# Patient Record
Sex: FEMALE | Race: WHITE | Employment: FULL TIME | URBAN - METROPOLITAN AREA
[De-identification: names, ages, dates, MRNs, and addresses within clinical notes are randomized per-mention and may not be internally consistent; named-entity substitution may affect disease eponyms.]

---

## 2017-04-14 ENCOUNTER — TRANSFERRED RECORDS (OUTPATIENT)
Dept: HEALTH INFORMATION MANAGEMENT | Facility: CLINIC | Age: 22
End: 2017-04-14

## 2017-06-15 ENCOUNTER — TRANSFERRED RECORDS (OUTPATIENT)
Dept: HEALTH INFORMATION MANAGEMENT | Facility: CLINIC | Age: 22
End: 2017-06-15

## 2018-06-08 ENCOUNTER — TRANSFERRED RECORDS (OUTPATIENT)
Dept: HEALTH INFORMATION MANAGEMENT | Facility: CLINIC | Age: 23
End: 2018-06-08

## 2018-06-08 LAB — PAP SMEAR - HIM PATIENT REPORTED: NEGATIVE

## 2018-06-18 ENCOUNTER — TRANSFERRED RECORDS (OUTPATIENT)
Dept: HEALTH INFORMATION MANAGEMENT | Facility: CLINIC | Age: 23
End: 2018-06-18

## 2018-06-18 LAB
CHOLEST SERPL-MCNC: 162 MG/DL (ref 100–199)
CREAT SERPL-MCNC: 0.57 MG/DL (ref 0.57–1)
GFR SERPL CREATININE-BSD FRML MDRD: 130 ML/MIN/1.73M2
GLUCOSE SERPL-MCNC: 93 MG/DL (ref 65–99)
HDLC SERPL-MCNC: 43 MG/DL
LDLC SERPL CALC-MCNC: 87 MG/DL (ref 0–99)
POTASSIUM SERPL-SCNC: 4.2 MMOL/L (ref 3.5–5.2)
TRIGL SERPL-MCNC: 160 MG/DL (ref 0–149)
TSH SERPL-ACNC: 1.56 UIU/ML (ref 0.45–4.5)

## 2018-06-29 ENCOUNTER — TRANSFERRED RECORDS (OUTPATIENT)
Dept: HEALTH INFORMATION MANAGEMENT | Facility: CLINIC | Age: 23
End: 2018-06-29

## 2019-01-29 ENCOUNTER — OFFICE VISIT (OUTPATIENT)
Dept: FAMILY MEDICINE | Facility: CLINIC | Age: 24
End: 2019-01-29
Payer: COMMERCIAL

## 2019-01-29 VITALS
RESPIRATION RATE: 14 BRPM | WEIGHT: 224.6 LBS | TEMPERATURE: 97.8 F | BODY MASS INDEX: 37.42 KG/M2 | OXYGEN SATURATION: 99 % | HEART RATE: 101 BPM | HEIGHT: 65 IN | SYSTOLIC BLOOD PRESSURE: 118 MMHG | DIASTOLIC BLOOD PRESSURE: 78 MMHG

## 2019-01-29 DIAGNOSIS — F33.1 MODERATE EPISODE OF RECURRENT MAJOR DEPRESSIVE DISORDER (H): ICD-10-CM

## 2019-01-29 DIAGNOSIS — Z23 NEED FOR PROPHYLACTIC VACCINATION AND INOCULATION AGAINST INFLUENZA: ICD-10-CM

## 2019-01-29 DIAGNOSIS — F41.1 GAD (GENERALIZED ANXIETY DISORDER): Primary | ICD-10-CM

## 2019-01-29 PROCEDURE — 90686 IIV4 VACC NO PRSV 0.5 ML IM: CPT | Performed by: INTERNAL MEDICINE

## 2019-01-29 PROCEDURE — 99203 OFFICE O/P NEW LOW 30 MIN: CPT | Mod: 25 | Performed by: INTERNAL MEDICINE

## 2019-01-29 PROCEDURE — 90471 IMMUNIZATION ADMIN: CPT | Performed by: INTERNAL MEDICINE

## 2019-01-29 RX ORDER — SERTRALINE HYDROCHLORIDE 25 MG/1
25 TABLET, FILM COATED ORAL DAILY
Qty: 90 TABLET | Refills: 3 | Status: SHIPPED | OUTPATIENT
Start: 2019-01-29 | End: 2019-03-29

## 2019-01-29 ASSESSMENT — PATIENT HEALTH QUESTIONNAIRE - PHQ9
SUM OF ALL RESPONSES TO PHQ QUESTIONS 1-9: 16
5. POOR APPETITE OR OVEREATING: NEARLY EVERY DAY

## 2019-01-29 ASSESSMENT — ANXIETY QUESTIONNAIRES
IF YOU CHECKED OFF ANY PROBLEMS ON THIS QUESTIONNAIRE, HOW DIFFICULT HAVE THESE PROBLEMS MADE IT FOR YOU TO DO YOUR WORK, TAKE CARE OF THINGS AT HOME, OR GET ALONG WITH OTHER PEOPLE: SOMEWHAT DIFFICULT
1. FEELING NERVOUS, ANXIOUS, OR ON EDGE: MORE THAN HALF THE DAYS
5. BEING SO RESTLESS THAT IT IS HARD TO SIT STILL: SEVERAL DAYS
2. NOT BEING ABLE TO STOP OR CONTROL WORRYING: MORE THAN HALF THE DAYS
6. BECOMING EASILY ANNOYED OR IRRITABLE: NOT AT ALL
GAD7 TOTAL SCORE: 11
7. FEELING AFRAID AS IF SOMETHING AWFUL MIGHT HAPPEN: SEVERAL DAYS
3. WORRYING TOO MUCH ABOUT DIFFERENT THINGS: MORE THAN HALF THE DAYS

## 2019-01-29 ASSESSMENT — MIFFLIN-ST. JEOR: SCORE: 1760.27

## 2019-01-29 NOTE — PROGRESS NOTES
"  SUBJECTIVE:   Tracy Jeffrey is a 23 year old female who presents to clinic today for the following health issues:    Chief Complaint   Patient presents with     Establish Care     Anxiety     x diagnosed 3 years ago     Depression     New Patient/Transfer of Care    Abnormal Mood Symptoms  Onset: diagnosed w/ anxiety about 3 years ago, depression getting worse since Oct. 2018.  Has had issues throughout her life but didn't seek medical help until a few years ago.     Description:   Depression: YES  Anxiety: YES    Accompanying Signs & Symptoms:  Still participating in activities that you used to enjoy: YES/ and no, goes to work and does what she needs to do.  Not doing personal hobbies as much - arts/crafts  Fatigue: YES- definitely  Irritability: no - does not get upset easier, but does get anxious easier.   Difficulty concentrating: YES- very much so - has been the worse of the symptoms lately   Changes in appetite: YES- some days, loses appetite at time.  When sitting around tends to \"mindless\" eat  Problems with sleep: YES- having trouble sleeping recently when historically she's been a good sleeper.   Heart racing/beating fast : no   Thoughts of hurting yourself or others: none    History:   Recent stress: YES- stress intensive job in general, not new.  Getting harder to manage stress w/ job.  A bit of financial stress lately.   Prior depression hospitalization: None  Family history of depression: YES  Family history of anxiety: YES    Precipitating factors:   Alcohol/drug use: no     Alleviating factors:  Staying on top of things helps boost mood.    Being outside when the weather is nicer, likes to take walks and spend time in nature.    Clean house does boost mood, has not been motivated to clean lately though.      Therapies Tried and outcome: Zoloft (Sertraline) - for about 1.5 years, 25 mg once daily- this was helpful, she had a bit of headache and fatigue for the first week on this but that went away. " " She did have weight gain but she thinks that might have been due to other factors.  Stopped about 1.5 years ago since she moved and was having trouble getting her prescription after her move since she wasn't able to get in for her follow-up appointment.  She did gets \"zaps\" after stopping the medication.      She was following with a therapist for awhile until about 2017; this was also helpful.     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        PHQ-9 SCORE 1/29/2019   PHQ-9 Total Score 16     NIKOLAY-7 SCORE 1/29/2019   Total Score 11       Problem list and histories reviewed & adjusted, as indicated.  Additional history: as documented    Patient Active Problem List   Diagnosis     Health Care Home     NIKOLAY (generalized anxiety disorder)     Moderate episode of recurrent major depressive disorder (H)     Past Surgical History:   Procedure Laterality Date     C KYLEENA IUD 19.5 MG  07/2018     HC TOOTH EXTRACTION W/FORCEP  2007/2011    2 occaions     PAP DIAGNOSTIC SMEAR  12/2013    GYN       Social History     Tobacco Use     Smoking status: Never Smoker     Smokeless tobacco: Never Used   Substance Use Topics     Alcohol use: Yes     Alcohol/week: 1.5 oz     Types: 3 Standard drinks or equivalent per week     Family History   Problem Relation Age of Onset     Migraines Mother      Cerebrovascular Disease Maternal Grandfather      Pulmonary Embolism Paternal Grandmother      GERD Sister      Autism Spectrum Disorder Sister 3     Anxiety Disorder Sister      Depression Sister      C.A.D. No family hx of      Diabetes No family hx of      Hypertension No family hx of          Current Outpatient Medications   Medication Sig Dispense Refill     sertraline (ZOLOFT) 25 MG tablet Take 1 tablet (25 mg) by mouth daily 90 tablet 3     Cranberry 450 MG TABS Take 2 tablets by mouth daily       nitrofurantoin, macrocrystal-monohydrate, (MACROBID) 100 MG capsule   0     norgestimate-ethinyl estradiol (ORTHO-CYCLEN, SPRINTEC) " "0.25-35 MG-MCG tablet Take 1 tablet by mouth daily       sulfamethoxazole-trimethoprim (BACTRIM,SEPTRA) 400-80 MG per tablet Take 1 tablet by mouth as needed (Patient not taking: Reported on 1/29/2019) 10 tablet 0     Allergies   Allergen Reactions     Cats      Seasonal Allergies        Reviewed and updated as needed this visit by clinical staff  Tobacco  Allergies  Meds  Med Hx  Surg Hx  Fam Hx  Soc Hx      Reviewed and updated as needed this visit by Provider      Injectable Influenza Immunization Documentation    1.  Is the person to be vaccinated sick today?   No    2. Does the person to be vaccinated have an allergy to a component   of the vaccine?   No  Egg Allergy Algorithm Link    3. Has the person to be vaccinated ever had a serious reaction   to influenza vaccine in the past?   No    4. Has the person to be vaccinated ever had Guillain-Barré syndrome?   No    Form completed by Clare CURRY CMA ([facility])        OBJECTIVE:     /78 (BP Location: Left arm, Patient Position: Sitting, Cuff Size: Adult Large)   Pulse 101   Temp 97.8  F (36.6  C) (Tympanic)   Resp 14   Ht 1.644 m (5' 4.72\")   Wt 101.9 kg (224 lb 9.6 oz)   SpO2 99%   BMI 37.69 kg/m    Body mass index is 37.69 kg/m .  GENERAL: healthy, alert and no distress  PSYCH: mentation appears normal, affect normal/bright      ASSESSMENT/PLAN:       1. NIKOLAY (generalized anxiety disorder)  2. Moderate episode of recurrent major depressive disorder (H)    Tracy presents with worsening symptoms of depression and anxiety recently.  Was previously on sertraline 25mg and that was helpful and without major side effects, so we will resume this.  She is interested in resuming therapy, referred her to our clinic therapist.  Follow-up with me in 2 months for reassessment.     - sertraline (ZOLOFT) 25 MG tablet; Take 1 tablet (25 mg) by mouth daily  Dispense: 90 tablet; Refill: 3    3. Need for prophylactic vaccination and inoculation against influenza    - " FLU VACCINE, SPLIT VIRUS, IM (QUADRIVALENT) [12607]- >3 YRS  - Vaccine Administration, Initial [81446]      Keshav Hernandez MD  [facility]

## 2019-01-29 NOTE — PROGRESS NOTES

## 2019-01-30 ASSESSMENT — ANXIETY QUESTIONNAIRES: GAD7 TOTAL SCORE: 11

## 2019-02-15 ENCOUNTER — HEALTH MAINTENANCE LETTER (OUTPATIENT)
Age: 24
End: 2019-02-15

## 2019-03-23 NOTE — PROGRESS NOTES
SUBJECTIVE:   Tracy Jeffrey is a 23 year old female who presents to clinic today for the following health issues:      I saw Tracy on 1/29 for depression and anxiety.  We resumed 25mg sertraline since this worked for her in the past.  She planned to resume therapy- she has not yet done so, but is planning on doing EAP through work.  Plans to call this week.      Depression and Anxiety Follow-Up    Status since last visit: Improved - feels somewhat better but not all the way.  Motivation is better though not great, and concentration is still impaired.      Other associated symptoms: had drowsiness for a few weeks, that has subsided now.     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  Additional history: as documented    Patient Active Problem List   Diagnosis     Health Care Home     NIKOLAY (generalized anxiety disorder)     Moderate episode of recurrent major depressive disorder (H)     Past Surgical History:   Procedure Laterality Date     C KYLEENA IUD 19.5 MG  07/2018     HC TOOTH EXTRACTION W/FORCEP  2007/2011    2 occaions     PAP DIAGNOSTIC SMEAR  12/2013    GYN       Social History     Tobacco Use     Smoking status: Never Smoker     Smokeless tobacco: Never Used   Substance Use Topics     Alcohol use: Yes     Alcohol/week: 1.5 oz     Types: 3 Standard drinks or equivalent per week     Family History   Problem Relation Age of Onset     Migraines Mother      Cerebrovascular Disease Maternal Grandfather      Pulmonary Embolism Paternal Grandmother       "GERD Sister      Autism Spectrum Disorder Sister 3     Anxiety Disorder Sister      Depression Sister      C.A.D. No family hx of      Diabetes No family hx of      Hypertension No family hx of          Current Outpatient Medications   Medication Sig Dispense Refill     levonorgestrel (KYLEENA) 19.5 MG IUD 1 each by Intrauterine route once Placed June 2018       sertraline (ZOLOFT) 50 MG tablet Take 1 tablet (50 mg) by mouth daily 90 tablet 3     Allergies   Allergen Reactions     Cats      Seasonal Allergies        Reviewed and updated as needed this visit by clinical staff  Tobacco  Allergies  Meds  Med Hx  Surg Hx  Fam Hx  Soc Hx      Reviewed and updated as needed this visit by Provider           OBJECTIVE:     /80   Pulse 80   Ht 1.645 m (5' 4.75\")   Wt 100.2 kg (221 lb)   BMI 37.06 kg/m    Body mass index is 37.06 kg/m .  GENERAL: healthy, alert and no distress  PSYCH: mentation appears normal, affect normal/bright      ASSESSMENT/PLAN:         1. NIKOLAY (generalized anxiety disorder)  2. Moderate episode of recurrent major depressive disorder (H)    Symptoms somewhat improved on the sertraline 25 mg, but she still feels that there could be room for improvement.  She would like to try increasing sertraline up to 50 mg/day.  She plans to establish with therapy at her [facility] through work.  Follow-up in 2 months as needed if not improving..    - sertraline (ZOLOFT) 50 MG tablet; Take 1 tablet (50 mg) by mouth daily  Dispense: 90 tablet; Refill: 3    3. Screening for HIV (human immunodeficiency virus)    - HIV Antigen Antibody Combo      Keshav Hernandez MD  [facility]      "

## 2019-03-29 ENCOUNTER — OFFICE VISIT (OUTPATIENT)
Dept: FAMILY MEDICINE | Facility: CLINIC | Age: 24
End: 2019-03-29
Payer: COMMERCIAL

## 2019-03-29 VITALS
DIASTOLIC BLOOD PRESSURE: 80 MMHG | BODY MASS INDEX: 36.82 KG/M2 | HEIGHT: 65 IN | HEART RATE: 80 BPM | SYSTOLIC BLOOD PRESSURE: 112 MMHG | WEIGHT: 221 LBS

## 2019-03-29 DIAGNOSIS — F33.1 MODERATE EPISODE OF RECURRENT MAJOR DEPRESSIVE DISORDER (H): ICD-10-CM

## 2019-03-29 DIAGNOSIS — Z11.4 SCREENING FOR HIV (HUMAN IMMUNODEFICIENCY VIRUS): ICD-10-CM

## 2019-03-29 DIAGNOSIS — F41.1 GAD (GENERALIZED ANXIETY DISORDER): Primary | ICD-10-CM

## 2019-03-29 PROCEDURE — 36415 COLL VENOUS BLD VENIPUNCTURE: CPT | Performed by: INTERNAL MEDICINE

## 2019-03-29 PROCEDURE — 99213 OFFICE O/P EST LOW 20 MIN: CPT | Performed by: INTERNAL MEDICINE

## 2019-03-29 PROCEDURE — 87389 HIV-1 AG W/HIV-1&-2 AB AG IA: CPT | Performed by: INTERNAL MEDICINE

## 2019-03-29 ASSESSMENT — ANXIETY QUESTIONNAIRES
1. FEELING NERVOUS, ANXIOUS, OR ON EDGE: SEVERAL DAYS
6. BECOMING EASILY ANNOYED OR IRRITABLE: NOT AT ALL
7. FEELING AFRAID AS IF SOMETHING AWFUL MIGHT HAPPEN: NOT AT ALL
2. NOT BEING ABLE TO STOP OR CONTROL WORRYING: SEVERAL DAYS
GAD7 TOTAL SCORE: 5
3. WORRYING TOO MUCH ABOUT DIFFERENT THINGS: SEVERAL DAYS
5. BEING SO RESTLESS THAT IT IS HARD TO SIT STILL: SEVERAL DAYS

## 2019-03-29 ASSESSMENT — PATIENT HEALTH QUESTIONNAIRE - PHQ9
5. POOR APPETITE OR OVEREATING: SEVERAL DAYS
SUM OF ALL RESPONSES TO PHQ QUESTIONS 1-9: 8

## 2019-03-29 ASSESSMENT — MIFFLIN-ST. JEOR: SCORE: 1744.36

## 2019-03-29 NOTE — LETTER
My Depression Action Plan  Name: Tracy Jeffrey   Date of Birth 1995  Date: 3/29/2019    My doctor: Keshav Hernandez   [place]: [facility] [place]  [address] [id number] [phone]          GREEN    ZONE   Good Control    What it looks like:     Things are going generally well. You have normal up s and down s. You may even feel depressed from time to time, but bad moods usually last less than a day.   What you need to do:  1. Continue to care for yourself (see self care plan)  2. Check your depression survival kit and update it as needed  3. Follow your physician s recommendations including any medication.  4. Do not stop taking medication unless you consult with your physician first.           YELLOW         ZONE Getting Worse    What it looks like:     Depression is starting to interfere with your life.     It may be hard to get out of bed; you may be starting to isolate yourself from others.    Symptoms of depression are starting to last most all day and this has happened for several days.     You may have suicidal thoughts but they are not constant.   What you need to do:     1. Call your care team, your response to treatment will improve if you keep your care team informed of your progress. Yellow periods are signs an adjustment may need to be made.     2. Continue your self-care, even if you have to fake it!    3. Talk to someone in your support network    4. Open up your depression survival kit           RED    ZONE Medical Alert - Get Help    What it looks like:     Depression is seriously interfering with your life.     You may experience these or other symptoms: You can t get out of bed most days, can t work or engage in other necessary activities, you have trouble taking care of basic hygiene, or basic responsibilities, thoughts of suicide or death that will not go away, self-injurious behavior.     What you need to do:  1. Call your  care team and request a same-day appointment. If they are not available (weekends or after hours) call your local crisis line, emergency room or 911.            Depression Self Care Plan / Survival Kit    Self-Care for Depression  Here s the deal. Your body and mind are really not as separate as most people think.  What you do and think affects how you feel and how you feel influences what you do and think. This means if you do things that people who feel good do, it will help you feel better.  Sometimes this is all it takes.  There is also a place for medication and therapy depending on how severe your depression is, so be sure to consult with your medical provider and/ or Behavioral Health Consultant if your symptoms are worsening or not improving.     In order to better manage my stress, I will:    Exercise  Get some form of exercise, every day. This will help reduce pain and release endorphins, the  feel good  chemicals in your brain. This is almost as good as taking antidepressants!  This is not the same as joining a gym and then never going! (they count on that by the way ) It can be as simple as just going for a walk or doing some gardening, anything that will get you moving.      Hygiene   Maintain good hygiene (Get out of bed in the morning, Make your bed, Brush your teeth, Take a shower, and Get dressed like you were going to work, even if you are unemployed).  If your clothes don't fit try to get ones that do.    Diet  I will strive to eat foods that are good for me, drink plenty of water, and avoid excessive sugar, caffeine, alcohol, and other mood-altering substances.  Some foods that are helpful in depression are: complex carbohydrates, B vitamins, flaxseed, fish or fish oil, fresh fruits and vegetables.    Psychotherapy  I agree to participate in Individual Therapy (if recommended).    Medication  If prescribed medications, I agree to take them.  Missing doses can result in serious side effects.  I  understand that drinking alcohol, or other illicit drug use, may cause potential side effects.  I will not stop my medication abruptly without first discussing it with my provider.    Staying Connected With Others  I will stay in touch with my friends, family members, and my primary care provider/team.    Use your imagination  Be creative.  We all have a creative side; it doesn t matter if it s oil painting, sand castles, or mud pies! This will also kick up the endorphins.    Witness Beauty  (AKA stop and smell the roses) Take a look outside, even in mid-winter. Notice colors, textures. Watch the squirrels and birds.     Service to others  Be of service to others.  There is always someone else in need.  By helping others we can  get out of ourselves  and remember the really important things.  This also provides opportunities for practicing all the other parts of the program.    Humor  Laugh and be silly!  Adjust your TV habits for less news and crime-drama and more comedy.    Control your stress  Try breathing deep, massage therapy, biofeedback, and meditation. Find time to relax each day.     My support system    Clinic Contact:  Phone number:    Contact 1:  Phone number:    Contact 2:  Phone number:    Jewish/:  Phone number:    Therapist:  Phone number:    Local crisis center:    Phone number:    Other community support:  Phone number:

## 2019-03-30 LAB — HIV 1+2 AB+HIV1 P24 AG SERPL QL IA: NONREACTIVE

## 2019-03-30 ASSESSMENT — ANXIETY QUESTIONNAIRES: GAD7 TOTAL SCORE: 5

## 2019-07-08 ENCOUNTER — TELEPHONE (OUTPATIENT)
Dept: FAMILY MEDICINE | Facility: CLINIC | Age: 24
End: 2019-07-08

## 2019-07-08 ENCOUNTER — MYC MEDICAL ADVICE (OUTPATIENT)
Dept: FAMILY MEDICINE | Facility: CLINIC | Age: 24
End: 2019-07-08

## 2019-07-08 NOTE — TELEPHONE ENCOUNTER
Panel Management Review      Patient has the following on her problem list:     Depression / Dysthymia review    Measure:  Needs PHQ-9 score of 4 or less during index window.  Administer PHQ-9 and if score is 5 or more, send encounter to provider for next steps.    5 - 7 month window range: 5/30/19-9/27/19    PHQ-9 SCORE 1/29/2019 3/29/2019   PHQ-9 Total Score 16 8       If PHQ-9 recheck is 5 or more, route to provider for next steps.    Patient is due for:  PHQ9      Composite cancer screening  Chart review shows that this patient is due/due soon for the following None  Summary:    Patient is due/failing the following:   PHQ9    Action needed:   Patient needs to do PHQ9.    Type of outreach:    Sent [employer] message.   Will postpone x 1 week for the patient to view.    Questions for provider review:    None                                                                                                                                    RENATO Washington MA       Chart routed to Care Team .

## 2019-07-09 ASSESSMENT — ANXIETY QUESTIONNAIRES
7. FEELING AFRAID AS IF SOMETHING AWFUL MIGHT HAPPEN: NOT AT ALL
5. BEING SO RESTLESS THAT IT IS HARD TO SIT STILL: SEVERAL DAYS
GAD7 TOTAL SCORE: 3
GAD7 TOTAL SCORE: 3
4. TROUBLE RELAXING: NOT AT ALL
1. FEELING NERVOUS, ANXIOUS, OR ON EDGE: SEVERAL DAYS
GAD7 TOTAL SCORE: 3
2. NOT BEING ABLE TO STOP OR CONTROL WORRYING: NOT AT ALL
3. WORRYING TOO MUCH ABOUT DIFFERENT THINGS: SEVERAL DAYS
6. BECOMING EASILY ANNOYED OR IRRITABLE: NOT AT ALL
7. FEELING AFRAID AS IF SOMETHING AWFUL MIGHT HAPPEN: NOT AT ALL

## 2019-07-09 ASSESSMENT — PATIENT HEALTH QUESTIONNAIRE - PHQ9
SUM OF ALL RESPONSES TO PHQ QUESTIONS 1-9: 5
SUM OF ALL RESPONSES TO PHQ QUESTIONS 1-9: 5
10. IF YOU CHECKED OFF ANY PROBLEMS, HOW DIFFICULT HAVE THESE PROBLEMS MADE IT FOR YOU TO DO YOUR WORK, TAKE CARE OF THINGS AT HOME, OR GET ALONG WITH OTHER PEOPLE: SOMEWHAT DIFFICULT

## 2019-07-09 NOTE — TELEPHONE ENCOUNTER
Questionnaires completed on [employer].  Routed to provider for review.    PHQ-9 ([employer]) 7/9/2019   1.  Little interest or pleasure in doing things 1   2.  Feeling down, depressed, or hopeless 0   3.  Trouble falling or staying asleep, or sleeping too much 0   4.  Feeling tired or having little energy 1   5.  Poor appetite or overeating 1   6.  Feeling bad about yourself 1   7.  Trouble concentrating 1   8.  Moving slowly or restless 0   9.  Suicidal or self-harm thoughts 0   PHQ-9 Total Score 5   Difficulty at work, home, or with people    1.  Little interest or pleasure in doing things Several days   2.  Feeling down, depressed, or hopeless Not at all   3.  Trouble falling or staying asleep, or sleeping too much Not at all   4.  Feeling tired or having little energy Several days   5.  Poor appetite or overeating Several days   6.  Feeling bad about yourself Several days   7.  Trouble concentrating Several days   8.  Moving slowly or restless Not at all   9.  Suicidal or self-harm thoughts Not at all   PHQ-9 via [employer] TOTAL SCORE-----> 5 (Mild depression)   Difficulty at work, home, or with people Somewhat difficult   NIKOLAY-7   [employer], 2002; Used with Permission) 7/9/2019   1. Feeling nervous, anxious, or on edge Several days   2. Not being able to stop or control worrying Not at all   3. Worrying too much about different things Several days   4. Trouble relaxing Not at all   5. Being so restless that it is hard to sit still Several days   6. Becoming easily annoyed or irritable Not at all   7. Feeling afraid, as if something awful might happen Not at all   NIKOLAY 7 TOTAL SCORE 3 (minimal anxiety)   1. Feeling nervous, anxious, or on edge 1   2. Not being able to stop or control worrying 0   3. Worrying too much about different things 1   4. Trouble relaxing 0   5. Being so restless that it is hard to sit still 1   6. Becoming easily annoyed or irritable 0   7. Feeling afraid, as if something awful might happen 0    NIKOLAY-7 Total Score 3   If you checked any problems, how difficult have they made it for you to do your work, take care of things at home, or get along with other people?

## 2019-07-09 NOTE — TELEPHONE ENCOUNTER
Scores are slightly improved from a few months ago.  Can continue med as prescribed unless she is having any trouble; then would recommend follow-up.    Keshav Hernandez MD

## 2019-07-10 ASSESSMENT — ANXIETY QUESTIONNAIRES: GAD7 TOTAL SCORE: 3

## 2019-07-10 ASSESSMENT — PATIENT HEALTH QUESTIONNAIRE - PHQ9: SUM OF ALL RESPONSES TO PHQ QUESTIONS 1-9: 5

## 2019-08-05 ENCOUNTER — OFFICE VISIT (OUTPATIENT)
Dept: PSYCHOLOGY | Facility: CLINIC | Age: 24
End: 2019-08-05
Payer: COMMERCIAL

## 2019-08-05 DIAGNOSIS — F34.1 PERSISTENT DEPRESSIVE DISORDER: Primary | ICD-10-CM

## 2019-08-05 DIAGNOSIS — F41.1 GAD (GENERALIZED ANXIETY DISORDER): ICD-10-CM

## 2019-08-05 PROCEDURE — 90791 PSYCH DIAGNOSTIC EVALUATION: CPT | Performed by: MARRIAGE & FAMILY THERAPIST

## 2019-08-05 ASSESSMENT — ANXIETY QUESTIONNAIRES
7. FEELING AFRAID AS IF SOMETHING AWFUL MIGHT HAPPEN: NOT AT ALL
5. BEING SO RESTLESS THAT IT IS HARD TO SIT STILL: NOT AT ALL
7. FEELING AFRAID AS IF SOMETHING AWFUL MIGHT HAPPEN: NOT AT ALL
4. TROUBLE RELAXING: SEVERAL DAYS
6. BECOMING EASILY ANNOYED OR IRRITABLE: NOT AT ALL
1. FEELING NERVOUS, ANXIOUS, OR ON EDGE: SEVERAL DAYS
3. WORRYING TOO MUCH ABOUT DIFFERENT THINGS: SEVERAL DAYS
GAD7 TOTAL SCORE: 4
2. NOT BEING ABLE TO STOP OR CONTROL WORRYING: SEVERAL DAYS
GAD7 TOTAL SCORE: 4
GAD7 TOTAL SCORE: 4

## 2019-08-05 ASSESSMENT — COLUMBIA-SUICIDE SEVERITY RATING SCALE - C-SSRS
2. HAVE YOU ACTUALLY HAD ANY THOUGHTS OF KILLING YOURSELF LIFETIME?: NO
1. IN THE PAST MONTH, HAVE YOU WISHED YOU WERE DEAD OR WISHED YOU COULD GO TO SLEEP AND NOT WAKE UP?: NO
6. HAVE YOU EVER DONE ANYTHING, STARTED TO DO ANYTHING, OR PREPARED TO DO ANYTHING TO END YOUR LIFE?: NO
TOTAL  NUMBER OF ABORTED OR SELF INTERRUPTED ATTEMPTS PAST 3 MONTHS: NO
ATTEMPT LIFETIME: NO
TOTAL  NUMBER OF INTERRUPTED ATTEMPTS LIFETIME: NO
2. HAVE YOU ACTUALLY HAD ANY THOUGHTS OF KILLING YOURSELF?: NO
6. HAVE YOU EVER DONE ANYTHING, STARTED TO DO ANYTHING, OR PREPARED TO DO ANYTHING TO END YOUR LIFE?: NO
TOTAL  NUMBER OF INTERRUPTED ATTEMPTS PAST 3 MONTHS: NO
TOTAL  NUMBER OF ABORTED OR SELF INTERRUPTED ATTEMPTS PAST LIFETIME: NO
1. IN THE PAST MONTH, HAVE YOU WISHED YOU WERE DEAD OR WISHED YOU COULD GO TO SLEEP AND NOT WAKE UP?: NO
ATTEMPT PAST THREE MONTHS: NO

## 2019-08-05 ASSESSMENT — PATIENT HEALTH QUESTIONNAIRE - PHQ9
SUM OF ALL RESPONSES TO PHQ QUESTIONS 1-9: 9
SUM OF ALL RESPONSES TO PHQ QUESTIONS 1-9: 9
10. IF YOU CHECKED OFF ANY PROBLEMS, HOW DIFFICULT HAVE THESE PROBLEMS MADE IT FOR YOU TO DO YOUR WORK, TAKE CARE OF THINGS AT HOME, OR GET ALONG WITH OTHER PEOPLE: SOMEWHAT DIFFICULT
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
## 2019-08-05 NOTE — Clinical Note
Greetings, Dr. Hernandez.  I met with Tracy for Diagnostic Assessment/therapy intake.  Plan for therapy will be to focus on mood improvement and increasing calm mindset, along with possible (re-)processing of past trauma.  Thank you for the referral!

## 2019-08-06 ASSESSMENT — ANXIETY QUESTIONNAIRES: GAD7 TOTAL SCORE: 4

## 2019-08-06 ASSESSMENT — PATIENT HEALTH QUESTIONNAIRE - PHQ9: SUM OF ALL RESPONSES TO PHQ QUESTIONS 1-9: 9

## 2019-09-18 ENCOUNTER — PSYCHE (OUTPATIENT)
Dept: PSYCHOLOGY | Facility: CLINIC | Age: 24
End: 2019-09-18
Payer: COMMERCIAL

## 2019-09-18 DIAGNOSIS — F34.1 PERSISTENT DEPRESSIVE DISORDER: Primary | ICD-10-CM

## 2019-09-18 DIAGNOSIS — F41.1 GAD (GENERALIZED ANXIETY DISORDER): ICD-10-CM

## 2019-09-18 PROCEDURE — 90834 PSYTX W PT 45 MINUTES: CPT | Performed by: MARRIAGE & FAMILY THERAPIST

## 2019-09-18 ASSESSMENT — PATIENT HEALTH QUESTIONNAIRE - PHQ9
SUM OF ALL RESPONSES TO PHQ QUESTIONS 1-9: 12
SUM OF ALL RESPONSES TO PHQ QUESTIONS 1-9: 12
10. IF YOU CHECKED OFF ANY PROBLEMS, HOW DIFFICULT HAVE THESE PROBLEMS MADE IT FOR YOU TO DO YOUR WORK, TAKE CARE OF THINGS AT HOME, OR GET ALONG WITH OTHER PEOPLE: SOMEWHAT DIFFICULT

## 2019-09-18 ASSESSMENT — ANXIETY QUESTIONNAIRES
4. TROUBLE RELAXING: SEVERAL DAYS
6. BECOMING EASILY ANNOYED OR IRRITABLE: SEVERAL DAYS
1. FEELING NERVOUS, ANXIOUS, OR ON EDGE: MORE THAN HALF THE DAYS
2. NOT BEING ABLE TO STOP OR CONTROL WORRYING: SEVERAL DAYS
GAD7 TOTAL SCORE: 10
7. FEELING AFRAID AS IF SOMETHING AWFUL MIGHT HAPPEN: SEVERAL DAYS
3. WORRYING TOO MUCH ABOUT DIFFERENT THINGS: MORE THAN HALF THE DAYS
GAD7 TOTAL SCORE: 10
5. BEING SO RESTLESS THAT IT IS HARD TO SIT STILL: MORE THAN HALF THE DAYS
GAD7 TOTAL SCORE: 10
7. FEELING AFRAID AS IF SOMETHING AWFUL MIGHT HAPPEN: SEVERAL DAYS

## 2019-09-18 NOTE — PROGRESS NOTES
Progress Note    Patient Name: Tracy Jeffrey  Date: 9/18/19         Service Type: Individual  Video Visit: No     Session Start Time: 1:10  Session End Time: 1:52     Session Length: 38-52    Session #: 1    Attendees: Client attended alone     Treatment Plan Last Reviewed: 9/18/19, next due 12/18/19.  PHQ-9 / NIKOLAY-7 : completed.    DATA  Interactive Complexity: No  Crisis: No       Progress Since Last Session (Related to Symptoms / Goals / Homework):   Symptoms: Client reported that anxiety/depressive increased and are now decreasing.    Homework: Achieved / completed to satisfaction  Completed in session      Episode of Care Goals: Minimal progress - PREPARATION (Decided to change - considering how); Intervened by negotiating a change plan and determining options / strategies for behavior change, identifying triggers, exploring social supports, and working towards setting a date to begin behavior change     Current / Ongoing Stressors and Concerns:   Client reported that her busy schedule often produces anxiety for her.  Client reported struggling with work/life balance and in some relationships with family members.  Client identified wanting to engage in EMDR therapy, along with working on mood improvement, increasing calm mindset, and improving interpersonal functioning as her desired therapy goals.     Treatment Objective(s) Addressed in This Session:   Increase interest, engagement, and pleasure in doing things  Identify negative self-talk and behaviors: challenge core beliefs, myths, and actions  Improve concentration, focus, and mindfulness in daily activities   Client identified her desired therapy goals.     Intervention:   CBT: taught/reviewed with client behavioral triad.  DBT: taught/reviewed with client engaging in at least 30 minutes/day enjoyable activities.  Motivational Interviewing: used circular/Socratic questioning to elicit client's identification of  her desired therapy goals.  Solution Focused: taught/reviewed with client time management/scheduling strategies.        ASSESSMENT: Current Emotional / Mental Status (status of significant symptoms):   Risk status (Self / Other harm or suicidal ideation)   Patient denies current fears or concerns for personal safety.   Patient denies current or recent suicidal ideation or behaviors.   Patientdenies current or recent homicidal ideation or behaviors.   Patient denies current or recent self injurious behavior or ideation.   Patient denies other safety concerns.   Patient Patient reports there has been no change in risk factors since their last session.     PatientPatient reports there has been no change in protective factors since their last session.     Recommended that patient call 911 or go to the local ED should there be a change in any of these risk factors.     Appearance:   Appropriate    Eye Contact:   Good    Psychomotor Behavior: Normal    Attitude:   Cooperative    Orientation:   All   Speech    Rate / Production: Normal     Volume:  Normal    Mood:    Anxious  Depressed  Normal   Affect:    Appropriate  Worrisome    Thought Content:  Clear    Thought Form:  Coherent  Logical    Insight:    Good      Medication Review:   No changes to current psychiatric medication(s)     Medication Compliance:   Yes     Changes in Health Issues:   None reported     Chemical Use Review:   Substance Use: Chemical use reviewed, no active concerns identified      Tobacco Use: No current tobacco use.      Diagnosis:  1. Persistent depressive disorder    2. NIKOLAY (generalized anxiety disorder)        Collateral Reports Completed:   Not Applicable    PLAN: (Patient Tasks / Therapist Tasks / Other)  Client agreed to work on using taught/reviewed time management/scheduling strategies between now and follow-up session next week.        JEAN-PIERRE Mcgarry                                                          ______________________________________________________________________    Treatment Plan    Patient's Name: Tracy Jeffrey  YOB: 1995    Date: 9/18/19    DSM5 Diagnoses: 300.4 (F34.1) Persistent Depressive Disorder, Early onset or 300.02 (F41.1) Generalized Anxiety Disorder  Psychosocial / Contextual Factors: problems with primary support group: family conflict, trauma history  WHODAS: 23    Referral / Collaboration:  Referral to another professional/service is not indicated at this time..    Anticipated number of session or this episode of care: 11-20      MeasurableTreatment Goal(s) related to diagnosis / functional impairment(s)  Goal 1: Client will successfully process through past trauma defined as reporting 0 Subjective Units of Distress related to trauma on 0-10 scale and 7 on Validity of (positive) Cognition scale about self on 1-7 scale   I will know I've met my goal when I am less impacted by my past trauma.       Objective #A (Client Action)    Status: New - Date: 9/18/19      Client will identify past traumatic events/memories which are causing current distress.     Intervention(s)  Therapist will take client's history and facilitate client's identification of targets for EMDR.     Objective #B  Client will complete needed assessment(s) and [facility] EM[facility] resourcing to confirm readiness for EMDR.    Status: New - Date: 9/18/19      Intervention(s)  Therapist will administer Dissociative Experiences Scale, Multidimensional Inventory of Dissociation as needed and complete [facility] EM[facility] resourcing with client.     Objective #C  Client will engage in installing at least 2 EMDR resources.  Status: New - Date: 9/18/19      Intervention(s)  Therapist will complete EMDR resourcing (Container, Remote Control, grounding/progressive muscle relaxation, Light Stream, Inner Advisor) with client.     Objective #D (Client Action)    Status: New - Date: 9/18/19      Client will engage in  reprocessing all past traumatic event/memory targets.     Intervention(s)   Therapist will complete EMDR reprocessing with client.    Goal 2: Client will increase overall baseline calm mindset by 2 points on a 1-10 Likert scale per self-report (10 = optimal calm mindset).    I will know I've met my goal when I feel less anxious.      Objective #A (Client Action)    Status: New - Date: 9/18/19     Client will use cognitive strategies identified in therapy to challenge anxious thoughts.    Intervention(s)  Therapist will teach emotional regulation skills. CBT/REBT ABCD model.    Objective #B  Client will use at least 2 new coping skills for anxiety management in the next 12 weeks.    Status: New - Date: 9/18/19     Intervention(s)  Therapist will teach emotional regulation skills. DBT Core Mindfulness, Distress Tolerance, Emotion Regulation, and Interpersonal Effectiveness skills.    Goal 3: Client will improve overall baseline mood by 2 points on a 1-10 Likert scale per self-report (10 = optimal mood).    I will know I've met my goal when I feel better/less depressed overall.      Objective #A (Client Action)    Status: New - Date:  9/18/19    Client will Identify negative self-talk and behaviors: challenge core beliefs, myths, and actions.    Intervention(s)  Therapist will teach emotional regulation skills. CBT/REBT ABCD model.    Objective #B  Client will Increase interest, engagement, and pleasure in doing things  Decrease frequency and intensity of feeling down, depressed, hopeless  Improve concentration, focus, and mindfulness in daily activities .    Status: New - Date:  9/18/19    Intervention(s)  Therapist will teach emotional regulation skills. DBT Core Mindfulness, Distress Tolerance, Emotion Regulation, and Interpersonal Effetiveness skills.    Goal 4:  Client will improve her interpersonal functioning in relationships by 2 points on a 1-10 Likert scale per self-report (10 = optimal interpersonal  functioning).    I will know I've met my goal when my relationships have improved.      Objective #A (Client Action)    Status: New - Date: 9/18/19     Client will learn & utilize at least 2 assertive communication skills weekly.    Intervention(s)  Therapist will teach assertiveness skills. Nonviolent Communication and DBT Interpersonal Effectiveness skills..    Objective #B  Client will practice setting boundaries 2 times in the next 12 weeks.    Status: New - Date: 9/18/19     Intervention(s)  Therapist will teach assertiveness skills. [place]-setting..    Patient has reviewed and agreed to the above plan.      Elliot Moreno, LMFT  September 18, 2019

## 2019-09-19 ASSESSMENT — PATIENT HEALTH QUESTIONNAIRE - PHQ9: SUM OF ALL RESPONSES TO PHQ QUESTIONS 1-9: 12

## 2019-09-19 ASSESSMENT — ANXIETY QUESTIONNAIRES: GAD7 TOTAL SCORE: 10

## 2019-09-24 ENCOUNTER — OFFICE VISIT (OUTPATIENT)
Dept: FAMILY MEDICINE | Facility: CLINIC | Age: 24
End: 2019-09-24
Payer: COMMERCIAL

## 2019-09-24 VITALS
HEART RATE: 83 BPM | WEIGHT: 207.6 LBS | SYSTOLIC BLOOD PRESSURE: 116 MMHG | DIASTOLIC BLOOD PRESSURE: 70 MMHG | TEMPERATURE: 97.7 F | OXYGEN SATURATION: 98 % | BODY MASS INDEX: 34.81 KG/M2

## 2019-09-24 DIAGNOSIS — F33.1 MODERATE EPISODE OF RECURRENT MAJOR DEPRESSIVE DISORDER (H): ICD-10-CM

## 2019-09-24 DIAGNOSIS — F41.1 GAD (GENERALIZED ANXIETY DISORDER): Primary | ICD-10-CM

## 2019-09-24 PROCEDURE — 99214 OFFICE O/P EST MOD 30 MIN: CPT | Performed by: INTERNAL MEDICINE

## 2019-09-24 RX ORDER — SERTRALINE HYDROCHLORIDE 25 MG/1
25 TABLET, FILM COATED ORAL DAILY
Start: 2019-09-24 | End: 2019-11-19 | Stop reason: ALTCHOICE

## 2019-09-24 RX ORDER — BUPROPION HYDROCHLORIDE 150 MG/1
150 TABLET ORAL EVERY MORNING
Qty: 90 TABLET | Refills: 0 | Status: SHIPPED | OUTPATIENT
Start: 2019-09-24 | End: 2019-12-26

## 2019-09-24 ASSESSMENT — ANXIETY QUESTIONNAIRES
5. BEING SO RESTLESS THAT IT IS HARD TO SIT STILL: SEVERAL DAYS
GAD7 TOTAL SCORE: 7
6. BECOMING EASILY ANNOYED OR IRRITABLE: NOT AT ALL
1. FEELING NERVOUS, ANXIOUS, OR ON EDGE: SEVERAL DAYS
2. NOT BEING ABLE TO STOP OR CONTROL WORRYING: SEVERAL DAYS
7. FEELING AFRAID AS IF SOMETHING AWFUL MIGHT HAPPEN: NOT AT ALL
3. WORRYING TOO MUCH ABOUT DIFFERENT THINGS: MORE THAN HALF THE DAYS

## 2019-09-24 ASSESSMENT — PATIENT HEALTH QUESTIONNAIRE - PHQ9
SUM OF ALL RESPONSES TO PHQ QUESTIONS 1-9: 14
5. POOR APPETITE OR OVEREATING: MORE THAN HALF THE DAYS

## 2019-09-24 NOTE — PATIENT INSTRUCTIONS
Start the bupropion 150mg daily now and decrease the sertraline to 25mg daily.  Stop the sertraline after 2 weeks and continue the bupropion alone.

## 2019-09-24 NOTE — PROGRESS NOTES
Subjective     Tracy Jeffrey is a 23 year old female who presents to clinic today for the following health issues:  Chief Complaint   Patient presents with     Anxiety     Depression       HPI     I saw Tracy last in March and we increased Zoloft from 25mg to 50mg since symptoms were not controlled, though had improved on the 25mg dose.  She saw psychology on 8/5 and 9/18.      Depression and Anxiety Follow-Up - OVER ALL does not think medication is working great, or as well as it should be (does help some).  Having a lot of drowsiness issues.  Taking medication at night but having difficulty waking in the morning.  She has noticed decreased sexual drive.     How are you doing with your depression since your last visit? Improved a bit, still notices it affecting life. Having more good days than was having in the past.       How are you doing with your anxiety since your last visit? Spikes quite a lot lately,  Worsened a lot of stress w/ work and socially     Are you having other symptoms that might be associated with depression or anxiety? Yes:  sleep issues, motivation issues, task management over eating, binge eating.  Motivation is the biggest issues currently.      Have you had a significant life event? OTHER: 3 weddings - a lot of travel.      Do you have any concerns with your use of alcohol or other drugs? No    Social History     Tobacco Use     Smoking status: Never Smoker     Smokeless tobacco: Never Used   Substance Use Topics     Alcohol use: Yes     Alcohol/week: 2.5 standard drinks     Types: 3 Standard drinks or equivalent per week     Drug use: No     PHQ 8/5/2019 9/18/2019 9/24/2019   PHQ-9 Total Score 9 12 14   Q9: Thoughts of better off dead/self-harm past 2 weeks Not at all Not at all Not at all     NIKOLAY-7 SCORE 8/5/2019 9/18/2019 9/24/2019   Total Score 4 (minimal anxiety) 10 (moderate anxiety) -   Total Score 4 10 7       Suicide Assessment Five-step Evaluation and Treatment (SAFE-T)      How  many servings of fruits and vegetables do you eat daily?  2-3 on good days, NONE on bad days and will eat a lot of fast food.  0On average, how many sweetened beverages do you drink each day (soda, juice, sweet tea, etc)?   0  How many days per week do you miss taking your medication? 1 every now and again.    What makes it hard for you to take your medications?  remembering to take and taking at night and falling asleep before taking medication        Patient Active Problem List   Diagnosis     Health Care Home     NIKOLAY (generalized anxiety disorder)     Moderate episode of recurrent major depressive disorder (H)     Past Surgical History:   Procedure Laterality Date     C KYLEENA IUD 19.5 MG  07/2018     HC TOOTH EXTRACTION W/FORCEP  2007/2011    2 occaions     PAP DIAGNOSTIC SMEAR  12/2013    GYN       Social History     Tobacco Use     Smoking status: Never Smoker     Smokeless tobacco: Never Used   Substance Use Topics     Alcohol use: Yes     Alcohol/week: 2.5 standard drinks     Types: 3 Standard drinks or equivalent per week     Family History   Problem Relation Age of Onset     Migraines Mother      Cerebrovascular Disease Maternal Grandfather      Pulmonary Embolism Paternal Grandmother      GERD Sister      Autism Spectrum Disorder Sister 3     Anxiety Disorder Sister      Depression Sister      C.A.D. No family hx of      Diabetes No family hx of      Hypertension No family hx of          Current Outpatient Medications   Medication Sig Dispense Refill     buPROPion (WELLBUTRIN XL) 150 MG 24 hr tablet Take 1 tablet (150 mg) by mouth every morning 90 tablet 0     Multiple Vitamins-Minerals (MULTIVITAMIN ADULTS PO)        sertraline (ZOLOFT) 25 MG tablet Take 1 tablet (25 mg) by mouth daily for 14 days       levonorgestrel (KYLEENA) 19.5 MG IUD 1 each by Intrauterine route once Placed June 2018       Allergies   Allergen Reactions     Cats      Seasonal Allergies        Reviewed and updated as needed this  visit by Provider         Review of Systems   ROS COMP: Constitutional, psych systems are negative, except as otherwise noted.      Objective    /70 (BP Location: Right arm, Patient Position: Sitting, Cuff Size: Adult Large)   Pulse 83   Temp 97.7  F (36.5  C) (Tympanic)   Wt 94.2 kg (207 lb 9.6 oz)   SpO2 98%   BMI 34.81 kg/m    Body mass index is 34.81 kg/m .  Physical Exam   GENERAL: healthy, alert and no distress  PSYCH: mentation appears normal, affect normal/bright          Assessment & Plan     1. NIKOLAY (generalized anxiety disorder)  and  2. Moderate episode of recurrent major depressive disorder (H)    Tracy does not feel that the sertraline 50 mg tablet is working well for her symptoms and she is finding it side effects of sedation, decreased sexual drive.  We discussed potential alterations in medication therapy.  We could try increasing the sertraline, but this may exacerbate side effects she is having.  We discussed switching to another SSRI, trying an SNRI, or trying Wellbutrin.  She decided to try the Wellbutrin since this should be nonsedating and not have any effect on sexual drive.  She is also trying to lose weight, which may be aided by the Wellbutrin.  We will have her start 150 mg of the Wellbutrin and decrease her sertraline down to 25 mg for 2 weeks, and then stop sertraline and continue the bupropion alone.  Follow-up in 2 months- can be E visit or telephone encounter.    - buPROPion (WELLBUTRIN XL) 150 MG 24 hr tablet; Take 1 tablet (150 mg) by mouth every morning  Dispense: 90 tablet; Refill: 0  - sertraline (ZOLOFT) 25 MG tablet; Take 1 tablet (25 mg) by mouth daily for 14 days      Return in about 2 months (around 11/24/2019) for Routine Visit- can be E visit or telephone visit.    Keshav Hernandez MD  [facility]    Chart documentation was done using Dragon dictation software. Although reviewed after completion, some errors may remain.

## 2019-09-25 ENCOUNTER — PSYCHE (OUTPATIENT)
Dept: PSYCHOLOGY | Facility: CLINIC | Age: 24
End: 2019-09-25
Payer: COMMERCIAL

## 2019-09-25 ENCOUNTER — OFFICE VISIT (OUTPATIENT)
Dept: OBGYN | Facility: CLINIC | Age: 24
End: 2019-09-25
Payer: COMMERCIAL

## 2019-09-25 VITALS
BODY MASS INDEX: 34.89 KG/M2 | SYSTOLIC BLOOD PRESSURE: 125 MMHG | HEART RATE: 101 BPM | RESPIRATION RATE: 16 BRPM | DIASTOLIC BLOOD PRESSURE: 79 MMHG | TEMPERATURE: 98.6 F | HEIGHT: 65 IN | WEIGHT: 209.4 LBS

## 2019-09-25 DIAGNOSIS — Z00.00 ROUTINE GENERAL MEDICAL EXAMINATION AT A HEALTH CARE FACILITY: Primary | ICD-10-CM

## 2019-09-25 DIAGNOSIS — F34.1 PERSISTENT DEPRESSIVE DISORDER: Primary | ICD-10-CM

## 2019-09-25 DIAGNOSIS — F41.1 GAD (GENERALIZED ANXIETY DISORDER): ICD-10-CM

## 2019-09-25 PROCEDURE — 90834 PSYTX W PT 45 MINUTES: CPT | Performed by: MARRIAGE & FAMILY THERAPIST

## 2019-09-25 PROCEDURE — 99385 PREV VISIT NEW AGE 18-39: CPT | Performed by: OBSTETRICS & GYNECOLOGY

## 2019-09-25 ASSESSMENT — ANXIETY QUESTIONNAIRES
2. NOT BEING ABLE TO STOP OR CONTROL WORRYING: SEVERAL DAYS
5. BEING SO RESTLESS THAT IT IS HARD TO SIT STILL: SEVERAL DAYS
1. FEELING NERVOUS, ANXIOUS, OR ON EDGE: SEVERAL DAYS
6. BECOMING EASILY ANNOYED OR IRRITABLE: NOT AT ALL
GAD7 TOTAL SCORE: 7
3. WORRYING TOO MUCH ABOUT DIFFERENT THINGS: MORE THAN HALF THE DAYS
7. FEELING AFRAID AS IF SOMETHING AWFUL MIGHT HAPPEN: NOT AT ALL
GAD7 TOTAL SCORE: 7

## 2019-09-25 ASSESSMENT — PATIENT HEALTH QUESTIONNAIRE - PHQ9
10. IF YOU CHECKED OFF ANY PROBLEMS, HOW DIFFICULT HAVE THESE PROBLEMS MADE IT FOR YOU TO DO YOUR WORK, TAKE CARE OF THINGS AT HOME, OR GET ALONG WITH OTHER PEOPLE: SOMEWHAT DIFFICULT
SUM OF ALL RESPONSES TO PHQ QUESTIONS 1-9: 14
SUM OF ALL RESPONSES TO PHQ QUESTIONS 1-9: 15
SUM OF ALL RESPONSES TO PHQ QUESTIONS 1-9: 14
5. POOR APPETITE OR OVEREATING: MORE THAN HALF THE DAYS

## 2019-09-25 ASSESSMENT — MIFFLIN-ST. JEOR: SCORE: 1695.71

## 2019-09-25 NOTE — PROGRESS NOTES
"                                           Progress Note    Patient Name: Tracy Jeffrey  Date: 9/25/19         Service Type: Individual  Video Visit: No     Session Start Time: 1:00  Session End Time: 1:52     Session Length: 38-52    Session #: 2    Attendees: Client attended alone     Treatment Plan Last Reviewed: 9/18/19, next due 12/18/19.  PHQ-9 / NIKOLAY-7 : completed.    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  Client reported having anxiety with having to coordinate her cousin's bachelorette party with her mother, who is invalidating towards her.     Treatment Objective(s) Addressed in This Session:   use at least some coping skills for anxiety management in the next three weeks  Identify negative self-talk and behaviors: challenge core beliefs, myths, and actions  Improve concentration, focus, and mindfulness in daily activities      Intervention:   CBT: taught/reviewed with client empathic distress concept and being aware of separation between herself and others' sufferings..  DBT: taught/reviewed with client mindfulness practice of focusing on current reality and choosing focus, and practicing self-validation.        ASSESSMENT: Current Emotional / Mental Status (status of significant symptoms):   Risk status (Self / Other harm or suicidal ideation)   Patient denies current fears or concerns for personal safety.   Patient denies current or recent suicidal " ideation or behaviors.   Patientdenies current or recent homicidal ideation or behaviors.   Patient denies current or recent self injurious behavior or ideation.   Patient denies other safety concerns.   Patient Patient reports there has been no change in risk factors since their last session.     PatientPatient reports there has been no change in protective factors since their last session.     Recommended that patient call 911 or go to the local ED should there be a change in any of these risk factors.     Appearance:   Appropriate    Eye Contact:   Good    Psychomotor Behavior: Normal    Attitude:   Cooperative    Orientation:   All   Speech    Rate / Production: Normal     Volume:  Normal    Mood:    Anxious  Depressed  Normal   Affect:    Appropriate  Worrisome    Thought Content:  Clear    Thought Form:  Coherent  Logical    Insight:    Good      Medication Review:   No changes to current psychiatric medication(s)     Medication Compliance:   Yes     Changes in Health Issues:   None reported     Chemical Use Review:   Substance Use: Chemical use reviewed, no active concerns identified      Tobacco Use: No current tobacco use.      Diagnosis:  1. Persistent depressive disorder    2. NIKOLAY (generalized anxiety disorder)        Collateral Reports Completed:   Not Applicable    PLAN: (Patient Tasks / Therapist Tasks / Other)  Client agreed to work on using taught/reviewed mindfulness/self-validation/empathic distress skills/strategies between now and follow-up session in three weeks.        JEAN-PIERRE Mcgarry                                                         ______________________________________________________________________    Treatment Plan    Patient's Name: Tracy Jeffrey  YOB: 1995    Date: 9/18/19    DSM5 Diagnoses: 300.4 (F34.1) Persistent Depressive Disorder, Early onset or 300.02 (F41.1) Generalized Anxiety Disorder  Psychosocial / Contextual Factors: problems with primary  support group: family conflict, trauma history  WHODAS: 25    Referral / Collaboration:  Referral to another professional/service is not indicated at this time..    Anticipated number of session or this episode of care: 11-20      MeasurableTreatment Goal(s) related to diagnosis / functional impairment(s)  Goal 1: Client will successfully process through past trauma defined as reporting 0 Subjective Units of Distress related to trauma on 0-10 scale and 7 on Validity of (positive) Cognition scale about self on 1-7 scale   I will know I've met my goal when I am less impacted by my past trauma.       Objective #A (Client Action)    Status: New - Date: 9/18/19      Client will identify past traumatic events/memories which are causing current distress.     Intervention(s)  Therapist will take client's history and facilitate client's identification of targets for EMDR.     Objective #B  Client will complete needed assessment(s) and [facility] EM[facility] resourcing to confirm readiness for EMDR.    Status: New - Date: 9/18/19      Intervention(s)  Therapist will administer Dissociative Experiences Scale, Multidimensional Inventory of Dissociation as needed and complete [facility] EMDR resourcing with client.     Objective #C  Client will engage in installing at least 2 EMDR resources.  Status: New - Date: 9/18/19      Intervention(s)  Therapist will complete EMDR resourcing (Container, Remote Control, grounding/progressive muscle relaxation, Light Stream, Inner Advisor) with client.     Objective #D (Client Action)    Status: New - Date: 9/18/19      Client will engage in reprocessing all past traumatic event/memory targets.     Intervention(s)   Therapist will complete EMDR reprocessing with client.    Goal 2: Client will increase overall baseline calm mindset by 2 points on a 1-10 Likert scale per self-report (10 = optimal calm mindset).    I will know I've met my goal when I feel less anxious.      Objective #A (Client  Action)    Status: New - Date: 9/18/19     Client will use cognitive strategies identified in therapy to challenge anxious thoughts.    Intervention(s)  Therapist will teach emotional regulation skills. CBT/REBT ABCD model.    Objective #B  Client will use at least 2 new coping skills for anxiety management in the next 12 weeks.    Status: New - Date: 9/18/19     Intervention(s)  Therapist will teach emotional regulation skills. DBT Core Mindfulness, Distress Tolerance, Emotion Regulation, and Interpersonal Effectiveness skills.    Goal 3: Client will improve overall baseline mood by 2 points on a 1-10 Likert scale per self-report (10 = optimal mood).    I will know I've met my goal when I feel better/less depressed overall.      Objective #A (Client Action)    Status: New - Date:  9/18/19    Client will Identify negative self-talk and behaviors: challenge core beliefs, myths, and actions.    Intervention(s)  Therapist will teach emotional regulation skills. CBT/REBT ABCD model.    Objective #B  Client will Increase interest, engagement, and pleasure in doing things  Decrease frequency and intensity of feeling down, depressed, hopeless  Improve concentration, focus, and mindfulness in daily activities .    Status: New - Date:  9/18/19    Intervention(s)  Therapist will teach emotional regulation skills. DBT Core Mindfulness, Distress Tolerance, Emotion Regulation, and Interpersonal Effetiveness skills.    Goal 4:  Client will improve her interpersonal functioning in relationships by 2 points on a 1-10 Likert scale per self-report (10 = optimal interpersonal functioning).    I will know I've met my goal when my relationships have improved.      Objective #A (Client Action)    Status: New - Date: 9/18/19     Client will learn & utilize at least 2 assertive communication skills weekly.    Intervention(s)  Therapist will teach assertiveness skills. Nonviolent Communication and DBT Interpersonal Effectiveness  skills..    Objective #B  Client will practice setting boundaries 2 times in the next 12 weeks.    Status: New - Date: 9/18/19     Intervention(s)  Therapist will teach assertiveness skills. [place]-setting..    Patient has reviewed and agreed to the above plan.      Elliot Moreno, LMFT  September 25, 2019

## 2019-09-25 NOTE — NURSING NOTE
"Initial /79 (BP Location: Left arm, Patient Position: Chair, Cuff Size: Adult Regular)   Pulse 101   Temp 98.6  F (37  C) (Tympanic)   Resp 16   Ht 1.651 m (5' 5\")   Wt 95 kg (209 lb 6.4 oz)   LMP 08/30/2019 (Approximate)   BMI 34.85 kg/m   Estimated body mass index is 34.85 kg/m  as calculated from the following:    Height as of this encounter: 1.651 m (5' 5\").    Weight as of this encounter: 95 kg (209 lb 6.4 oz). .      "
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
## 2019-09-26 ASSESSMENT — ANXIETY QUESTIONNAIRES: GAD7 TOTAL SCORE: 7

## 2019-09-26 ASSESSMENT — PATIENT HEALTH QUESTIONNAIRE - PHQ9: SUM OF ALL RESPONSES TO PHQ QUESTIONS 1-9: 14

## 2019-10-16 ENCOUNTER — PSYCHE (OUTPATIENT)
Dept: PSYCHOLOGY | Facility: CLINIC | Age: 24
End: 2019-10-16
Payer: COMMERCIAL

## 2019-10-16 DIAGNOSIS — F34.1 PERSISTENT DEPRESSIVE DISORDER: Primary | ICD-10-CM

## 2019-10-16 DIAGNOSIS — F41.1 GAD (GENERALIZED ANXIETY DISORDER): ICD-10-CM

## 2019-10-16 PROCEDURE — 90834 PSYTX W PT 45 MINUTES: CPT | Performed by: MARRIAGE & FAMILY THERAPIST

## 2019-10-16 ASSESSMENT — ANXIETY QUESTIONNAIRES
1. FEELING NERVOUS, ANXIOUS, OR ON EDGE: SEVERAL DAYS
5. BEING SO RESTLESS THAT IT IS HARD TO SIT STILL: SEVERAL DAYS
GAD7 TOTAL SCORE: 5
4. TROUBLE RELAXING: SEVERAL DAYS
3. WORRYING TOO MUCH ABOUT DIFFERENT THINGS: SEVERAL DAYS
7. FEELING AFRAID AS IF SOMETHING AWFUL MIGHT HAPPEN: NOT AT ALL
GAD7 TOTAL SCORE: 5
7. FEELING AFRAID AS IF SOMETHING AWFUL MIGHT HAPPEN: NOT AT ALL
2. NOT BEING ABLE TO STOP OR CONTROL WORRYING: NOT AT ALL
6. BECOMING EASILY ANNOYED OR IRRITABLE: SEVERAL DAYS
GAD7 TOTAL SCORE: 5

## 2019-10-16 ASSESSMENT — PATIENT HEALTH QUESTIONNAIRE - PHQ9
SUM OF ALL RESPONSES TO PHQ QUESTIONS 1-9: 10
10. IF YOU CHECKED OFF ANY PROBLEMS, HOW DIFFICULT HAVE THESE PROBLEMS MADE IT FOR YOU TO DO YOUR WORK, TAKE CARE OF THINGS AT HOME, OR GET ALONG WITH OTHER PEOPLE: SOMEWHAT DIFFICULT
SUM OF ALL RESPONSES TO PHQ QUESTIONS 1-9: 10
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
## 2019-10-17 ASSESSMENT — PATIENT HEALTH QUESTIONNAIRE - PHQ9: SUM OF ALL RESPONSES TO PHQ QUESTIONS 1-9: 10

## 2019-10-17 ASSESSMENT — ANXIETY QUESTIONNAIRES: GAD7 TOTAL SCORE: 5

## 2019-10-29 ENCOUNTER — OFFICE VISIT (OUTPATIENT)
Dept: PSYCHOLOGY | Facility: CLINIC | Age: 24
End: 2019-10-29
Payer: COMMERCIAL

## 2019-10-29 DIAGNOSIS — F41.1 GAD (GENERALIZED ANXIETY DISORDER): ICD-10-CM

## 2019-10-29 DIAGNOSIS — F34.1 PERSISTENT DEPRESSIVE DISORDER: Primary | ICD-10-CM

## 2019-10-29 PROCEDURE — 90832 PSYTX W PT 30 MINUTES: CPT | Performed by: MARRIAGE & FAMILY THERAPIST

## 2019-10-29 ASSESSMENT — PATIENT HEALTH QUESTIONNAIRE - PHQ9
SUM OF ALL RESPONSES TO PHQ QUESTIONS 1-9: 14
10. IF YOU CHECKED OFF ANY PROBLEMS, HOW DIFFICULT HAVE THESE PROBLEMS MADE IT FOR YOU TO DO YOUR WORK, TAKE CARE OF THINGS AT HOME, OR GET ALONG WITH OTHER PEOPLE: VERY DIFFICULT
5. POOR APPETITE OR OVEREATING: NEARLY EVERY DAY
SUM OF ALL RESPONSES TO PHQ QUESTIONS 1-9: 14

## 2019-10-29 ASSESSMENT — ANXIETY QUESTIONNAIRES
7. FEELING AFRAID AS IF SOMETHING AWFUL MIGHT HAPPEN: SEVERAL DAYS
6. BECOMING EASILY ANNOYED OR IRRITABLE: MORE THAN HALF THE DAYS
5. BEING SO RESTLESS THAT IT IS HARD TO SIT STILL: SEVERAL DAYS
1. FEELING NERVOUS, ANXIOUS, OR ON EDGE: MORE THAN HALF THE DAYS
3. WORRYING TOO MUCH ABOUT DIFFERENT THINGS: MORE THAN HALF THE DAYS
IF YOU CHECKED OFF ANY PROBLEMS ON THIS QUESTIONNAIRE, HOW DIFFICULT HAVE THESE PROBLEMS MADE IT FOR YOU TO DO YOUR WORK, TAKE CARE OF THINGS AT HOME, OR GET ALONG WITH OTHER PEOPLE: VERY DIFFICULT
2. NOT BEING ABLE TO STOP OR CONTROL WORRYING: MORE THAN HALF THE DAYS
GAD7 TOTAL SCORE: 13
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
## 2019-10-30 ASSESSMENT — ANXIETY QUESTIONNAIRES: GAD7 TOTAL SCORE: 13

## 2019-10-30 ASSESSMENT — PATIENT HEALTH QUESTIONNAIRE - PHQ9: SUM OF ALL RESPONSES TO PHQ QUESTIONS 1-9: 14

## 2019-11-15 ENCOUNTER — OFFICE VISIT (OUTPATIENT)
Dept: PSYCHOLOGY | Facility: CLINIC | Age: 24
End: 2019-11-15
Payer: COMMERCIAL

## 2019-11-15 DIAGNOSIS — F41.1 GAD (GENERALIZED ANXIETY DISORDER): ICD-10-CM

## 2019-11-15 DIAGNOSIS — F34.1 PERSISTENT DEPRESSIVE DISORDER: Primary | ICD-10-CM

## 2019-11-15 PROCEDURE — 90832 PSYTX W PT 30 MINUTES: CPT | Performed by: MARRIAGE & FAMILY THERAPIST

## 2019-11-15 ASSESSMENT — ANXIETY QUESTIONNAIRES
6. BECOMING EASILY ANNOYED OR IRRITABLE: SEVERAL DAYS
7. FEELING AFRAID AS IF SOMETHING AWFUL MIGHT HAPPEN: SEVERAL DAYS
1. FEELING NERVOUS, ANXIOUS, OR ON EDGE: MORE THAN HALF THE DAYS
GAD7 TOTAL SCORE: 9
4. TROUBLE RELAXING: SEVERAL DAYS
5. BEING SO RESTLESS THAT IT IS HARD TO SIT STILL: SEVERAL DAYS
3. WORRYING TOO MUCH ABOUT DIFFERENT THINGS: MORE THAN HALF THE DAYS
GAD7 TOTAL SCORE: 9
GAD7 TOTAL SCORE: 9
2. NOT BEING ABLE TO STOP OR CONTROL WORRYING: SEVERAL DAYS
7. FEELING AFRAID AS IF SOMETHING AWFUL MIGHT HAPPEN: SEVERAL DAYS

## 2019-11-15 ASSESSMENT — PATIENT HEALTH QUESTIONNAIRE - PHQ9
SUM OF ALL RESPONSES TO PHQ QUESTIONS 1-9: 11
10. IF YOU CHECKED OFF ANY PROBLEMS, HOW DIFFICULT HAVE THESE PROBLEMS MADE IT FOR YOU TO DO YOUR WORK, TAKE CARE OF THINGS AT HOME, OR GET ALONG WITH OTHER PEOPLE: SOMEWHAT DIFFICULT
SUM OF ALL RESPONSES TO PHQ QUESTIONS 1-9: 11

## 2019-11-15 NOTE — PROGRESS NOTES
Progress Note    Patient Name: Tracy Jeffrey  Date: 11/15/19         Service Type: Individual  Video Visit: No     Session Start Time: 8:30  Session End Time: 9:00     Session Length: 16-37    Session #: 5    Attendees: Client attended alone     Treatment Plan Last Reviewed: 9/18/19, next due 12/18/19.  PHQ-9 / NIKOLAY-7 : completed.    DATA  Interactive Complexity: No  Crisis: No       Progress Since Last Session (Related to Symptoms / Goals / Homework):   Symptoms: Improving client reported decreased PHQ-9 and NIKOLAY-7 scores.    Homework: Partially completed       Episode of Care Goals: Satisfactory progress - ACTION (Actively working towards change); Intervened by reinforcing change plan / affirming steps taken     Current / Ongoing Stressors and Concerns:   Client reported experiencing general lack of motivation regarding going to work.  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  Client reported struggling with a lot of past traumatic material.     Treatment Objective(s) Addressed in This Session:   Increase interest, engagement, and pleasure in doing things  Feel less tired and more energy during the day   Improve concentration, focus, and mindfulness in daily activities      Intervention:   DBT: re-reviewed with client practicing effective self-care to increase her energy/motivation.        ASSESSMENT: Current Emotional / Mental Status (status of significant symptoms):   Risk status (Self / Other harm or suicidal ideation)   Patient denies current fears or concerns for personal safety.   Patient denies current or recent suicidal ideation or behaviors.   Patientdenies current or recent homicidal ideation or behaviors.   Patient denies current or recent self injurious behavior or ideation.   Patient denies other safety concerns.   Patient Patient reports there has been no change in risk factors since their last session.     PatientPatient reports  there has been no change in protective factors since their last session.     Recommended that patient call 911 or go to the local ED should there be a change in any of these risk factors.     Appearance:   Appropriate    Eye Contact:   Good    Psychomotor Behavior: Normal    Attitude:   Cooperative    Orientation:   All   Speech    Rate / Production: Normal     Volume:  Normal    Mood:    Anxious  Depressed  Normal   Affect:    Appropriate  Worrisome    Thought Content:  Clear    Thought Form:  Coherent  Logical    Insight:    Good      Medication Review:   No changes to current psychiatric medication(s)     Medication Compliance:   Yes     Changes in Health Issues:   None reported     Chemical Use Review:   Substance Use: Chemical use reviewed, no active concerns identified      Tobacco Use: No current tobacco use.      Diagnosis:  1. Persistent depressive disorder    2. NIKOLAY (generalized anxiety disorder)        Collateral Reports Completed:   Not Applicable    PLAN: (Patient Tasks / Therapist Tasks / Other)  Client agreed to work on practicing self-care to improve her energy/motivation and to consider her plan for EMDR therapy between now and follow-up session next week.        Elliot Moreno, [facility]                                                         ______________________________________________________________________    Treatment Plan    Patient's Name: Tracy Jeffrey  YOB: 1995    Date: 9/18/19    DSM5 Diagnoses: 300.4 (F34.1) Persistent Depressive Disorder, Early onset or 300.02 (F41.1) Generalized Anxiety Disorder  Psychosocial / Contextual Factors: problems with primary support group: family conflict, trauma history  WHODAS: 23    Referral / Collaboration:  Referral to another professional/service is not indicated at this time..    Anticipated number of session or this episode of care: 11-20      MeasurableTreatment Goal(s) related to diagnosis / functional impairment(s)  Goal 1:  Client will successfully process through past trauma defined as reporting 0 Subjective Units of Distress related to trauma on 0-10 scale and 7 on Validity of (positive) Cognition scale about self on 1-7 scale   I will know I've met my goal when I am less impacted by my past trauma.       Objective #A (Client Action)    Status: New - Date: 9/18/19      Client will identify past traumatic events/memories which are causing current distress.     Intervention(s)  Therapist will take client's history and facilitate client's identification of targets for EMDR.     Objective #B  Client will complete needed assessment(s) and [facility] EMDR resourcing to confirm readiness for EMDR.    Status: New - Date: 9/18/19      Intervention(s)  Therapist will administer Dissociative Experiences Scale, Multidimensional Inventory of Dissociation as needed and complete [facility] EMDR resourcing with client.     Objective #C  Client will engage in installing at least 2 EMDR resources.  Status: New - Date: 9/18/19      Intervention(s)  Therapist will complete EMDR resourcing (Container, Remote Control, grounding/progressive muscle relaxation, Light Stream, Inner Advisor) with client.     Objective #D (Client Action)    Status: New - Date: 9/18/19      Client will engage in reprocessing all past traumatic event/memory targets.     Intervention(s)   Therapist will complete EMDR reprocessing with client.    Goal 2: Client will increase overall baseline calm mindset by 2 points on a 1-10 Likert scale per self-report (10 = optimal calm mindset).    I will know I've met my goal when I feel less anxious.      Objective #A (Client Action)    Status: New - Date: 9/18/19     Client will use cognitive strategies identified in therapy to challenge anxious thoughts.    Intervention(s)  Therapist will teach emotional regulation skills. CBT/REBT ABCD model.    Objective #B  Client will use at least 2 new coping skills for anxiety management in the next  12 weeks.    Status: New - Date: 9/18/19     Intervention(s)  Therapist will teach emotional regulation skills. DBT Core Mindfulness, Distress Tolerance, Emotion Regulation, and Interpersonal Effectiveness skills.    Goal 3: Client will improve overall baseline mood by 2 points on a 1-10 Likert scale per self-report (10 = optimal mood).    I will know I've met my goal when I feel better/less depressed overall.      Objective #A (Client Action)    Status: New - Date:  9/18/19    Client will Identify negative self-talk and behaviors: challenge core beliefs, myths, and actions.    Intervention(s)  Therapist will teach emotional regulation skills. CBT/REBT ABCD model.    Objective #B  Client will Increase interest, engagement, and pleasure in doing things  Decrease frequency and intensity of feeling down, depressed, hopeless  Improve concentration, focus, and mindfulness in daily activities .    Status: New - Date:  9/18/19    Intervention(s)  Therapist will teach emotional regulation skills. DBT Core Mindfulness, Distress Tolerance, Emotion Regulation, and Interpersonal Effetiveness skills.    Goal 4:  Client will improve her interpersonal functioning in relationships by 2 points on a 1-10 Likert scale per self-report (10 = optimal interpersonal functioning).    I will know I've met my goal when my relationships have improved.      Objective #A (Client Action)    Status: New - Date: 9/18/19     Client will learn & utilize at least 2 assertive communication skills weekly.    Intervention(s)  Therapist will teach assertiveness skills. Nonviolent Communication and DBT Interpersonal Effectiveness skills..    Objective #B  Client will practice setting boundaries 2 times in the next 12 weeks.    Status: New - Date: 9/18/19     Intervention(s)  Therapist will teach assertiveness skills. [place]-setting..    Patient has reviewed and agreed to the above plan.      JEAN-PIERRE Mcgarry  November 15, 2019

## 2019-11-16 ASSESSMENT — PATIENT HEALTH QUESTIONNAIRE - PHQ9: SUM OF ALL RESPONSES TO PHQ QUESTIONS 1-9: 11

## 2019-11-16 ASSESSMENT — ANXIETY QUESTIONNAIRES: GAD7 TOTAL SCORE: 9

## 2019-11-19 ENCOUNTER — OFFICE VISIT (OUTPATIENT)
Dept: LAB | Facility: SCHOOL | Age: 24
End: 2019-11-19
Payer: COMMERCIAL

## 2019-11-19 VITALS
TEMPERATURE: 98 F | BODY MASS INDEX: 36.29 KG/M2 | SYSTOLIC BLOOD PRESSURE: 122 MMHG | OXYGEN SATURATION: 100 % | DIASTOLIC BLOOD PRESSURE: 84 MMHG | RESPIRATION RATE: 12 BRPM | HEIGHT: 65 IN | WEIGHT: 217.8 LBS | HEART RATE: 91 BPM

## 2019-11-19 DIAGNOSIS — J01.10 ACUTE FRONTAL SINUSITIS, RECURRENCE NOT SPECIFIED: Primary | ICD-10-CM

## 2019-11-19 PROCEDURE — 99213 OFFICE O/P EST LOW 20 MIN: CPT

## 2019-11-19 RX ORDER — CETIRIZINE HYDROCHLORIDE 10 MG/1
10 TABLET ORAL DAILY
COMMUNITY

## 2019-11-19 ASSESSMENT — MIFFLIN-ST. JEOR: SCORE: 1733.81

## 2019-11-19 NOTE — PROGRESS NOTES
Subjective     Tracy Jeffrey is a 23 year old female who presents to clinic today for the following health issues:    HPI   ENT Symptoms             Symptoms: cc Present Absent Comment   Fever/Chills   x    Fatigue  x     Muscle Aches   x    Eye Irritation   x    Sneezing  x     Nasal Bam/Drg  x  Post nasal    Sinus Pressure/Pain x x     Loss of smell   x    Dental pain   x    Sore Throat  x     Swollen Glands   x    Ear Pain/Fullness  x  Left ear pain   Cough  x  Nonproductive, dry cough    Wheeze   x    Chest Pain   x    Shortness of breath   x    Rash   x    Other    Headache-sharp stabbing pain      Symptom duration:  2 days    Symptom severity:  mild    Treatments tried:  zyrtec    Contacts:  none but does teach        Patient Active Problem List   Diagnosis     Health Care Home     NIKOLAY (generalized anxiety disorder)     Moderate episode of recurrent major depressive disorder (H)     Past Surgical History:   Procedure Laterality Date     C KYLEENA IUD 19.5 MG  07/2018     HC TOOTH EXTRACTION W/FORCEP  2007/2011    2 occaions     PAP DIAGNOSTIC SMEAR  12/2013    GYN       Social History     Tobacco Use     Smoking status: Never Smoker     Smokeless tobacco: Never Used   Substance Use Topics     Alcohol use: Yes     Alcohol/week: 2.5 standard drinks     Types: 3 Standard drinks or equivalent per week     Comment: occ     Family History   Problem Relation Age of Onset     Migraines Mother      Cerebrovascular Disease Maternal Grandfather      Pulmonary Embolism Paternal Grandmother      GERD Sister      Autism Spectrum Disorder Sister 3     Anxiety Disorder Sister      Depression Sister      C.A.D. No family hx of      Diabetes No family hx of      Hypertension No family hx of              Reviewed and updated as needed this visit by Provider         Review of Systems   ROS COMP: Constitutional, HEENT, cardiovascular, pulmonary, gi and gu systems are negative, except as otherwise noted.      Objective    BP  "122/84 (BP Location: Right arm, Patient Position: Sitting, Cuff Size: Adult Large)   Pulse 91   Temp 98  F (36.7  C) (Tympanic)   Resp 12   Ht 1.651 m (5' 5\")   Wt 98.8 kg (217 lb 12.8 oz)   SpO2 100%   BMI 36.24 kg/m    Body mass index is 36.24 kg/m .  Physical Exam   GENERAL: healthy, alert and no distress  EYES: Eyes grossly normal to inspection, PERRL and conjunctivae and sclerae normal  HENT: normal cephalic/atraumatic, both ears: clear effusion, nose and mouth without ulcers or lesions, nasal mucosa edematous , oropharynx clear, oral mucous membranes moist and sinuses: frontal tenderness on both sides  NECK: no adenopathy, no asymmetry, masses, or scars and thyroid normal to palpation  RESP: lungs clear to auscultation - no rales, rhonchi or wheezes  CV: regular rate and rhythm, normal S1 S2, no S3 or S4, no murmur, click or rub, no peripheral edema and peripheral pulses strong  SKIN: no suspicious lesions or rashes  NEURO: Normal strength and tone, mentation intact and speech normal    Diagnostic Test Results:  Labs reviewed in Epic        Assessment & Plan       ICD-10-CM    1. Acute frontal sinusitis, recurrence not specified J01.10         BMI:   Estimated body mass index is 36.24 kg/m  as calculated from the following:    Height as of this encounter: 1.651 m (5' 5\").    Weight as of this encounter: 98.8 kg (217 lb 12.8 oz).     FUTURE APPOINTMENTS:       - Follow-up visit in 1-2 weeks for persistent symptoms, sooner PRN new or worsening symptoms. Advised to stop antihistamine, use a allergy eye drop such as Zaditor, over the counter, instead. Trial Flonase or OTC cold and sinus decongestant.     Patient Instructions     Patient Education     Sinusitis (Antibiotic Treatment)    The sinuses are air-filled spaces within the bones of the face. They connect to the inside of the nose. Sinusitis is an inflammation of the tissue that lines the sinuses. Sinusitis can occur during a cold. It can also happen " due to allergies to pollens and other particles in the air. Sinusitis can cause symptoms of sinus congestion and a feeling of fullness. A sinus infection causes fever, headache, and facial pain. There is often green or yellow fluid draining from the nose or into the back of the throat (post-nasal drip). You have been given antibiotics to treat this condition.  Home care    Take the full course of antibiotics as instructed. Do not stop taking them, even when you feel better.    Drink plenty of water, hot tea, and other liquids. This may help thin nasal mucus. It also may help your sinuses drain fluids.    Heat may help soothe painful areas of your face. Use a towel soaked in hot water. Or,  the shower and direct the warm spray onto your face. Using a vaporizer along with a menthol rub at night may also help soothe symptoms.     An expectorant with guaifenesin may help thin nasal mucus and help your sinuses drain fluids.    You can use an over-the-counter decongestant, unless a similar medicine was prescribed to you. Nasal sprays work the fastest. Use one that contains phenylephrine or oxymetazoline. First blow your nose gently. Then use the spray. Do not use these medicines more often than directed on the label. If you do, your symptoms may get worse. You may also take pills that contain pseudoephedrine. Don t use products that combine multiple medicines. This is because side effects may be increased. Read labels. You can also ask the pharmacist for help. (People with high blood pressure should not use decongestants. They can raise blood pressure.)    Over-the-counter antihistamines may help if allergies contributed to your sinusitis.      Do not use nasal rinses or irrigation during an acute sinus infection, unless your healthcare provider tells you to. Rinsing may spread the infection to other areas in your sinuses.    Use acetaminophen or ibuprofen to control pain, unless another pain medicine was  prescribed to you. If you have chronic liver or kidney disease or ever had a stomach ulcer, talk with your healthcare provider before using these medicines. (Aspirin should never be taken by anyone under age 18 who is ill with a fever. It may cause severe liver damage.)    Don't smoke. This can make symptoms worse.  Follow-up care  Follow up with your healthcare provider or our staff if you are not better in 1 week.  When to seek medical advice  Call your healthcare provider if any of these occur:    Facial pain or headache that gets worse    Stiff neck    Unusual drowsiness or confusion    Swelling of your forehead or eyelids    Vision problems, such as blurred or double vision    Fever of 100.4 F (38 C) or higher, or as directed by your healthcare provider    Seizure    Breathing problems    Symptoms don't go away in 10 days  Prevention  Here are steps you can take to help prevent an infection:    Keep good hand washing habits.    Don t have close contact with people who have sore throats, colds, or other upper respiratory infections.    Don t smoke, and stay away from secondhand smoke.    Stay up to date with of your vaccines.  Date Last Reviewed: 11/1/2017 2000-2018 [employer]. [address] [place] [address], [place] [address]. All rights reserved. This information is not intended as a substitute for professional medical care. Always follow your healthcare professional's instructions.               No follow-ups on file.    [facility] PROVIDER  [facility]

## 2019-11-19 NOTE — PATIENT INSTRUCTIONS
Patient Education     Sinusitis (Antibiotic Treatment)    The sinuses are air-filled spaces within the bones of the face. They connect to the inside of the nose. Sinusitis is an inflammation of the tissue that lines the sinuses. Sinusitis can occur during a cold. It can also happen due to allergies to pollens and other particles in the air. Sinusitis can cause symptoms of sinus congestion and a feeling of fullness. A sinus infection causes fever, headache, and facial pain. There is often green or yellow fluid draining from the nose or into the back of the throat (post-nasal drip). You have been given antibiotics to treat this condition.  Home care    Take the full course of antibiotics as instructed. Do not stop taking them, even when you feel better.    Drink plenty of water, hot tea, and other liquids. This may help thin nasal mucus. It also may help your sinuses drain fluids.    Heat may help soothe painful areas of your face. Use a towel soaked in hot water. Or,  the shower and direct the warm spray onto your face. Using a vaporizer along with a menthol rub at night may also help soothe symptoms.     An expectorant with guaifenesin may help thin nasal mucus and help your sinuses drain fluids.    You can use an over-the-counter decongestant, unless a similar medicine was prescribed to you. Nasal sprays work the fastest. Use one that contains phenylephrine or oxymetazoline. First blow your nose gently. Then use the spray. Do not use these medicines more often than directed on the label. If you do, your symptoms may get worse. You may also take pills that contain pseudoephedrine. Don t use products that combine multiple medicines. This is because side effects may be increased. Read labels. You can also ask the pharmacist for help. (People with high blood pressure should not use decongestants. They can raise blood pressure.)    Over-the-counter antihistamines may help if allergies contributed to your  sinusitis.      Do not use nasal rinses or irrigation during an acute sinus infection, unless your healthcare provider tells you to. Rinsing may spread the infection to other areas in your sinuses.    Use acetaminophen or ibuprofen to control pain, unless another pain medicine was prescribed to you. If you have chronic liver or kidney disease or ever had a stomach ulcer, talk with your healthcare provider before using these medicines. (Aspirin should never be taken by anyone under age 18 who is ill with a fever. It may cause severe liver damage.)    Don't smoke. This can make symptoms worse.  Follow-up care  Follow up with your healthcare provider or our staff if you are not better in 1 week.  When to seek medical advice  Call your healthcare provider if any of these occur:    Facial pain or headache that gets worse    Stiff neck    Unusual drowsiness or confusion    Swelling of your forehead or eyelids    Vision problems, such as blurred or double vision    Fever of 100.4 F (38 C) or higher, or as directed by your healthcare provider    Seizure    Breathing problems    Symptoms don't go away in 10 days  Prevention  Here are steps you can take to help prevent an infection:    Keep good hand washing habits.    Don t have close contact with people who have sore throats, colds, or other upper respiratory infections.    Don t smoke, and stay away from secondhand smoke.    Stay up to date with of your vaccines.  Date Last Reviewed: 11/1/2017 2000-2018 [employer]. [address] [place] [address], [place] [address]. All rights reserved. This information is not intended as a substitute for professional medical care. Always follow your healthcare professional's instructions.

## 2019-12-03 ENCOUNTER — OFFICE VISIT (OUTPATIENT)
Dept: PSYCHOLOGY | Facility: CLINIC | Age: 24
End: 2019-12-03
Payer: COMMERCIAL

## 2019-12-03 DIAGNOSIS — F41.1 GAD (GENERALIZED ANXIETY DISORDER): ICD-10-CM

## 2019-12-03 DIAGNOSIS — F34.1 PERSISTENT DEPRESSIVE DISORDER: Primary | ICD-10-CM

## 2019-12-03 PROCEDURE — 90785 PSYTX COMPLEX INTERACTIVE: CPT | Performed by: MARRIAGE & FAMILY THERAPIST

## 2019-12-03 PROCEDURE — 90834 PSYTX W PT 45 MINUTES: CPT | Performed by: MARRIAGE & FAMILY THERAPIST

## 2019-12-03 ASSESSMENT — ANXIETY QUESTIONNAIRES
2. NOT BEING ABLE TO STOP OR CONTROL WORRYING: NOT AT ALL
6. BECOMING EASILY ANNOYED OR IRRITABLE: SEVERAL DAYS
GAD7 TOTAL SCORE: 5
7. FEELING AFRAID AS IF SOMETHING AWFUL MIGHT HAPPEN: NOT AT ALL
1. FEELING NERVOUS, ANXIOUS, OR ON EDGE: SEVERAL DAYS
4. TROUBLE RELAXING: SEVERAL DAYS
GAD7 TOTAL SCORE: 5
5. BEING SO RESTLESS THAT IT IS HARD TO SIT STILL: SEVERAL DAYS
GAD7 TOTAL SCORE: 5
7. FEELING AFRAID AS IF SOMETHING AWFUL MIGHT HAPPEN: NOT AT ALL
3. WORRYING TOO MUCH ABOUT DIFFERENT THINGS: SEVERAL DAYS

## 2019-12-03 ASSESSMENT — PATIENT HEALTH QUESTIONNAIRE - PHQ9
10. IF YOU CHECKED OFF ANY PROBLEMS, HOW DIFFICULT HAVE THESE PROBLEMS MADE IT FOR YOU TO DO YOUR WORK, TAKE CARE OF THINGS AT HOME, OR GET ALONG WITH OTHER PEOPLE: SOMEWHAT DIFFICULT
SUM OF ALL RESPONSES TO PHQ QUESTIONS 1-9: 10
SUM OF ALL RESPONSES TO PHQ QUESTIONS 1-9: 10

## 2019-12-03 NOTE — PROGRESS NOTES
Progress Note    Patient Name: Tracy Jeffrey  Date: 12/3/19         Service Type: Individual  Video Visit: No     Session Start Time: 8:17  Session End Time: 9:00     Session Length: 38-52    Session #: 6    Attendees: Client attended alone     Treatment Plan Last Reviewed: 9/18/19, next due 12/18/19.  PHQ-9 / NIKOLAY-7 : completed.    DATA  Interactive Complexity: Yes, visit entailed Interactive Complexity evidenced by:  -Use of play equipment, physical devices,  or  to overcome barriers to diagnostic or therapeutic interaction with a patient who is not fluent in the same language or who has not developed or lost expressive or receptive language skills to use or understand typical language  Crisis: No       Progress Since Last Session (Related to Symptoms / Goals / Homework):   Symptoms: Improving client reported decreased PHQ-9 and NIKOLAY-7 scores.    Homework: Achieved / completed to satisfaction       Episode of Care Goals: Satisfactory progress - ACTION (Actively working towards change); Intervened by reinforcing change plan / affirming steps taken     Current / Ongoing Stressors and Concerns:   Client reported wanting to engage in starting EMDR therapy in this session.  Client opted to engage in EMDR therapy [facility] resource installation during today's sesion.     Treatment Objective(s) Addressed in This Session:   Client will complete needed assessment(s) and [facility] EMDR resourcing to confirm readiness for EMDR     Intervention:   EMDR: installed [facility] resource.        ASSESSMENT: Current Emotional / Mental Status (status of significant symptoms):   Risk status (Self / Other harm or suicidal ideation)   Patient denies current fears or concerns for personal safety.   Patient denies current or recent suicidal ideation or behaviors.   Patientdenies current or recent homicidal ideation or behaviors.   Patient denies current or recent self  injurious behavior or ideation.   Patient denies other safety concerns.   Patient Patient reports there has been no change in risk factors since their last session.     PatientPatient reports there has been no change in protective factors since their last session.     Recommended that patient call 911 or go to the local ED should there be a change in any of these risk factors.     Appearance:   Appropriate    Eye Contact:   Good    Psychomotor Behavior: Normal    Attitude:   Cooperative    Orientation:   All   Speech    Rate / Production: Normal     Volume:  Normal    Mood:    Anxious  Depressed  Normal   Affect:    Appropriate  Worrisome    Thought Content:  Clear    Thought Form:  Coherent  Logical    Insight:    Good      Medication Review:   No changes to current psychiatric medication(s)     Medication Compliance:   Yes     Changes in Health Issues:   None reported     Chemical Use Review:   Substance Use: Chemical use reviewed, no active concerns identified      Tobacco Use: No current tobacco use.      Diagnosis:  1. Persistent depressive disorder    2. NIKOLAY (generalized anxiety disorder)        Collateral Reports Completed:   Not Applicable    PLAN: (Patient Tasks / Therapist Tasks / Other)  Client agreed to engage in EMDR therapy [facility] resourcing at least once daily and additionally as needed for distress between now and follow-up session next week.        JEAN-PIERRE Mcgarry                                                         ______________________________________________________________________    Treatment Plan    Patient's Name: Tracy Jeffrey  YOB: 1995    Date: 9/18/19    DSM5 Diagnoses: 300.4 (F34.1) Persistent Depressive Disorder, Early onset or 300.02 (F41.1) Generalized Anxiety Disorder  Psychosocial / Contextual Factors: problems with primary support group: family conflict, trauma history  WHODAS: 25    Referral / Collaboration:  Referral to another  professional/service is not indicated at this time..    Anticipated number of session or this episode of care: 11-20      MeasurableTreatment Goal(s) related to diagnosis / functional impairment(s)  Goal 1: Client will successfully process through past trauma defined as reporting 0 Subjective Units of Distress related to trauma on 0-10 scale and 7 on Validity of (positive) Cognition scale about self on 1-7 scale   I will know I've met my goal when I am less impacted by my past trauma.       Objective #A (Client Action)    Status: New - Date: 9/18/19      Client will identify past traumatic events/memories which are causing current distress.     Intervention(s)  Therapist will take client's history and facilitate client's identification of targets for EMDR.     Objective #B  Client will complete needed assessment(s) and [facility] EMDR resourcing to confirm readiness for EMDR.    Status: New - Date: 9/18/19      Intervention(s)  Therapist will administer Dissociative Experiences Scale, Multidimensional Inventory of Dissociation as needed and complete [facility] EMDR resourcing with client.     Objective #C  Client will engage in installing at least 2 EMDR resources.  Status: New - Date: 9/18/19      Intervention(s)  Therapist will complete EMDR resourcing (Container, Remote Control, grounding/progressive muscle relaxation, Light Stream, Inner Advisor) with client.     Objective #D (Client Action)    Status: New - Date: 9/18/19      Client will engage in reprocessing all past traumatic event/memory targets.     Intervention(s)   Therapist will complete EMDR reprocessing with client.    Goal 2: Client will increase overall baseline calm mindset by 2 points on a 1-10 Likert scale per self-report (10 = optimal calm mindset).    I will know I've met my goal when I feel less anxious.      Objective #A (Client Action)    Status: New - Date: 9/18/19     Client will use cognitive strategies identified in therapy to challenge  anxious thoughts.    Intervention(s)  Therapist will teach emotional regulation skills. CBT/REBT ABCD model.    Objective #B  Client will use at least 2 new coping skills for anxiety management in the next 12 weeks.    Status: New - Date: 9/18/19     Intervention(s)  Therapist will teach emotional regulation skills. DBT Core Mindfulness, Distress Tolerance, Emotion Regulation, and Interpersonal Effectiveness skills.    Goal 3: Client will improve overall baseline mood by 2 points on a 1-10 Likert scale per self-report (10 = optimal mood).    I will know I've met my goal when I feel better/less depressed overall.      Objective #A (Client Action)    Status: New - Date:  9/18/19    Client will Identify negative self-talk and behaviors: challenge core beliefs, myths, and actions.    Intervention(s)  Therapist will teach emotional regulation skills. CBT/REBT ABCD model.    Objective #B  Client will Increase interest, engagement, and pleasure in doing things  Decrease frequency and intensity of feeling down, depressed, hopeless  Improve concentration, focus, and mindfulness in daily activities .    Status: New - Date:  9/18/19    Intervention(s)  Therapist will teach emotional regulation skills. DBT Core Mindfulness, Distress Tolerance, Emotion Regulation, and Interpersonal Effetiveness skills.    Goal 4:  Client will improve her interpersonal functioning in relationships by 2 points on a 1-10 Likert scale per self-report (10 = optimal interpersonal functioning).    I will know I've met my goal when my relationships have improved.      Objective #A (Client Action)    Status: New - Date: 9/18/19     Client will learn & utilize at least 2 assertive communication skills weekly.    Intervention(s)  Therapist will teach assertiveness skills. Nonviolent Communication and DBT Interpersonal Effectiveness skills..    Objective #B  Client will practice setting boundaries 2 times in the next 12 weeks.    Status: New - Date: 9/18/19      Intervention(s)  Therapist will teach assertiveness skills. [place]-setting..    Patient has reviewed and agreed to the above plan.      JEAN-PIERRE Mcgarry  December 3, 2019

## 2019-12-04 ASSESSMENT — ANXIETY QUESTIONNAIRES: GAD7 TOTAL SCORE: 5

## 2019-12-04 ASSESSMENT — PATIENT HEALTH QUESTIONNAIRE - PHQ9: SUM OF ALL RESPONSES TO PHQ QUESTIONS 1-9: 10

## 2019-12-09 ENCOUNTER — OFFICE VISIT (OUTPATIENT)
Dept: PSYCHOLOGY | Facility: CLINIC | Age: 24
End: 2019-12-09
Payer: COMMERCIAL

## 2019-12-09 DIAGNOSIS — F34.1 PERSISTENT DEPRESSIVE DISORDER: Primary | ICD-10-CM

## 2019-12-09 DIAGNOSIS — F41.1 GAD (GENERALIZED ANXIETY DISORDER): ICD-10-CM

## 2019-12-09 PROCEDURE — 90834 PSYTX W PT 45 MINUTES: CPT | Performed by: MARRIAGE & FAMILY THERAPIST

## 2019-12-09 PROCEDURE — 90785 PSYTX COMPLEX INTERACTIVE: CPT | Performed by: MARRIAGE & FAMILY THERAPIST

## 2019-12-09 ASSESSMENT — PATIENT HEALTH QUESTIONNAIRE - PHQ9
SUM OF ALL RESPONSES TO PHQ QUESTIONS 1-9: 11
SUM OF ALL RESPONSES TO PHQ QUESTIONS 1-9: 11
10. IF YOU CHECKED OFF ANY PROBLEMS, HOW DIFFICULT HAVE THESE PROBLEMS MADE IT FOR YOU TO DO YOUR WORK, TAKE CARE OF THINGS AT HOME, OR GET ALONG WITH OTHER PEOPLE: SOMEWHAT DIFFICULT

## 2019-12-09 NOTE — PROGRESS NOTES
Progress Note    Patient Name: Tracy Jeffrey  Date: 12/9/19         Service Type: Individual  Video Visit: No     Session Start Time: 5:00  Session End Time: 5:52     Session Length: 38-52    Session #: 7    Attendees: Client attended alone     Treatment Plan Last Reviewed: 9/18/19, next due 12/18/19.  PHQ-9 / NIKOLAY-7 : completed.    DATA  Interactive Complexity: Yes, visit entailed Interactive Complexity evidenced by:  -Use of play equipment, physical devices,  or  to overcome barriers to diagnostic or therapeutic interaction with a patient who is not fluent in the same language or who has not developed or lost expressive or receptive language skills to use or understand typical language  Crisis: No       Progress Since Last Session (Related to Symptoms / Goals / Homework):   Symptoms: Worsening client reported increased PHQ-9 score.    Homework: Achieved / completed to satisfaction       Episode of Care Goals: Minimal progress - ACTION (Actively working towards change); Intervened by reinforcing change plan / affirming steps taken     Current / Ongoing Stressors and Concerns:   Client reported having used EMDR [facility] resource regarding issues in her past relationship with her ex-significant other, specifically when she caught him cheating on her.  Client opted to engage in starting EMDR therapy reprocessing regarding said relationship issue during today's sesion.     Treatment Objective(s) Addressed in This Session:   Client will engage in reprocessing all past traumatic event/memory targets.     Intervention:   EMDR: reprocessed client's ex-significant other cheating on her (incomplete).        ASSESSMENT: Current Emotional / Mental Status (status of significant symptoms):   Risk status (Self / Other harm or suicidal ideation)   Patient denies current fears or concerns for personal safety.   Patient denies current or recent suicidal ideation or  behaviors.   Patientdenies current or recent homicidal ideation or behaviors.   Patient denies current or recent self injurious behavior or ideation.   Patient denies other safety concerns.   Patient Patient reports there has been no change in risk factors since their last session.     PatientPatient reports there has been no change in protective factors since their last session.     Recommended that patient call 911 or go to the local ED should there be a change in any of these risk factors.     Appearance:   Appropriate    Eye Contact:   Good    Psychomotor Behavior: Normal    Attitude:   Cooperative    Orientation:   All   Speech    Rate / Production: Normal     Volume:  Normal    Mood:    Anxious  Depressed  Normal   Affect:    Appropriate  Worrisome    Thought Content:  Clear    Thought Form:  Coherent  Logical    Insight:    Good      Medication Review:   No changes to current psychiatric medication(s)     Medication Compliance:   Yes     Changes in Health Issues:   None reported     Chemical Use Review:   Substance Use: Chemical use reviewed, no active concerns identified      Tobacco Use: No current tobacco use.      Diagnosis:  1. Persistent depressive disorder    2. NIKOLAY (generalized anxiety disorder)        Collateral Reports Completed:   Not Applicable    PLAN: (Patient Tasks / Therapist Tasks / Other)  Client agreed to continue to engage in EMDR therapy reprocessing of relationship issues with her ex-significant other, utilizing EMDR therapy resourcing as needed for distress between now and follow-up session next week.        Elliot Moreno, JEAN-PIERRE                                                         ______________________________________________________________________    Treatment Plan    Patient's Name: Tracy Jeffrey  YOB: 1995    Date: 9/18/19    DSM5 Diagnoses: 300.4 (F34.1) Persistent Depressive Disorder, Early onset or 300.02 (F41.1) Generalized Anxiety  Disorder  Psychosocial / Contextual Factors: problems with primary support group: family conflict, trauma history  WHODAS: 25    Referral / Collaboration:  Referral to another professional/service is not indicated at this time..    Anticipated number of session or this episode of care: 11-20      MeasurableTreatment Goal(s) related to diagnosis / functional impairment(s)  Goal 1: Client will successfully process through past trauma defined as reporting 0 Subjective Units of Distress related to trauma on 0-10 scale and 7 on Validity of (positive) Cognition scale about self on 1-7 scale   I will know I've met my goal when I am less impacted by my past trauma.       Objective #A (Client Action)    Status: New - Date: 9/18/19      Client will identify past traumatic events/memories which are causing current distress.     Intervention(s)  Therapist will take client's history and facilitate client's identification of targets for EMDR.     Objective #B  Client will complete needed assessment(s) and [facility] EMDR resourcing to confirm readiness for EMDR.    Status: New - Date: 9/18/19      Intervention(s)  Therapist will administer Dissociative Experiences Scale, Multidimensional Inventory of Dissociation as needed and complete [facility] EMDR resourcing with client.     Objective #C  Client will engage in installing at least 2 EMDR resources.  Status: New - Date: 9/18/19      Intervention(s)  Therapist will complete EMDR resourcing (Container, Remote Control, grounding/progressive muscle relaxation, Light Stream, Inner Advisor) with client.     Objective #D (Client Action)    Status: New - Date: 9/18/19      Client will engage in reprocessing all past traumatic event/memory targets.     Intervention(s)   Therapist will complete EMDR reprocessing with client.    Goal 2: Client will increase overall baseline calm mindset by 2 points on a 1-10 Likert scale per self-report (10 = optimal calm mindset).    I will know I've met  my goal when I feel less anxious.      Objective #A (Client Action)    Status: New - Date: 9/18/19     Client will use cognitive strategies identified in therapy to challenge anxious thoughts.    Intervention(s)  Therapist will teach emotional regulation skills. CBT/REBT ABCD model.    Objective #B  Client will use at least 2 new coping skills for anxiety management in the next 12 weeks.    Status: New - Date: 9/18/19     Intervention(s)  Therapist will teach emotional regulation skills. DBT Core Mindfulness, Distress Tolerance, Emotion Regulation, and Interpersonal Effectiveness skills.    Goal 3: Client will improve overall baseline mood by 2 points on a 1-10 Likert scale per self-report (10 = optimal mood).    I will know I've met my goal when I feel better/less depressed overall.      Objective #A (Client Action)    Status: New - Date:  9/18/19    Client will Identify negative self-talk and behaviors: challenge core beliefs, myths, and actions.    Intervention(s)  Therapist will teach emotional regulation skills. CBT/REBT ABCD model.    Objective #B  Client will Increase interest, engagement, and pleasure in doing things  Decrease frequency and intensity of feeling down, depressed, hopeless  Improve concentration, focus, and mindfulness in daily activities .    Status: New - Date:  9/18/19    Intervention(s)  Therapist will teach emotional regulation skills. DBT Core Mindfulness, Distress Tolerance, Emotion Regulation, and Interpersonal Effetiveness skills.    Goal 4:  Client will improve her interpersonal functioning in relationships by 2 points on a 1-10 Likert scale per self-report (10 = optimal interpersonal functioning).    I will know I've met my goal when my relationships have improved.      Objective #A (Client Action)    Status: New - Date: 9/18/19     Client will learn & utilize at least 2 assertive communication skills weekly.    Intervention(s)  Therapist will teach assertiveness skills. Nonviolent  Communication and DBT Interpersonal Effectiveness skills..    Objective #B  Client will practice setting boundaries 2 times in the next 12 weeks.    Status: New - Date: 9/18/19     Intervention(s)  Therapist will teach assertiveness skills. [place]-setting..    Patient has reviewed and agreed to the above plan.      JEAN-PIERRE Mcgarry  December 9, 2019

## 2019-12-10 ASSESSMENT — PATIENT HEALTH QUESTIONNAIRE - PHQ9: SUM OF ALL RESPONSES TO PHQ QUESTIONS 1-9: 11

## 2019-12-21 DIAGNOSIS — F33.1 MODERATE EPISODE OF RECURRENT MAJOR DEPRESSIVE DISORDER (H): ICD-10-CM

## 2019-12-21 DIAGNOSIS — F41.1 GAD (GENERALIZED ANXIETY DISORDER): ICD-10-CM

## 2019-12-23 NOTE — TELEPHONE ENCOUNTER
"Requested Prescriptions   Pending Prescriptions Disp Refills     buPROPion (WELLBUTRIN XL) 150 MG 24 hr tablet [Pharmacy Med Name: BUPROPION HCL  MG TABLET] 90 tablet 0     Sig: TAKE 1 TABLET BY MOUTH EVERY MORNING   Last Written Prescription Date:  9/24/19  Last Fill Quantity: 90 tab,  # refills: 0   Last office visit: 11/19/2019 with prescribing provider:  EMI Hernandez   Future Office Visit:   Next 5 appointments (look out 90 days)    Dec 27, 2019  7:00 AM CST  Return Visit with Elliot Moreno, [facility] ([facility]) [address] [place] [id number]  [phone]   Sung 10, 2020  8:00 AM CST  Return Visit with Elliot Moreno, [facility] ([address] [address]  [phone]             SSRIs Protocol Failed - 12/21/2019  5:18 PM        Failed - PHQ-9 score less than 5 in past 6 months     Please review last PHQ-9 score.           Passed - Medication is Bupropion     If the medication is Bupropion (Wellbutrin), and the patient is taking for smoking cessation; OK to refill.          Passed - Medication is active on med list        Passed - Patient is age 18 or older        Passed - No active pregnancy on record        Passed - No positive pregnancy test in last 12 months        Passed - Recent (6 mo) or future (30 days) visit within the authorizing provider's specialty     Patient had office visit in the last 6 months or has a visit in the next 30 days with authorizing provider or within the authorizing provider's specialty.  See \"Patient Info\" tab in inbasket, or \"Choose Columns\" in Meds & Orders section of the refill encounter.              "

## 2019-12-26 RX ORDER — BUPROPION HYDROCHLORIDE 150 MG/1
TABLET ORAL
Qty: 90 TABLET | Refills: 0 | Status: SHIPPED | OUTPATIENT
Start: 2019-12-26 | End: 2020-03-20

## 2019-12-26 NOTE — TELEPHONE ENCOUNTER
PHQ-9 score:    PHQ-9 SCORE 12/9/2019   PHQ-9 Total Score MyChart 11 (Moderate depression)   PHQ-9 Total Score 11       Routing refill request to provider for review/approval because:  Labs out of range:  phq-9 greater than 5               Patient : Silvia Boggs Age: 1 year old Sex: male   MRN: [id number] Encounter Date: 5/28/2018  Y02/Y02    History     Chief Complaint   Patient presents with   • Derm Problem     HPI  Pt's parents are [place] speaking. History obtained with the assistance of a .    4:47 PM Silvia Boggs is a 1 year old male who presents to ED c/o 1 insect bite to his lower back that he obtained while playing outside yesterday. The areas of the bites are pruritic and \"hot\". His parents believe that the pt was bitten by mosquitos. His parents placed a cream over the bitten areas without improvement. The pt does not have bed bugs at home. No other complaints or modifying factors reported.    PCP: Non- Pcp    Allergies   Allergen Reactions   • Amoxicillin RASH       Family History reviewed. No pertinent family history.     Surgical History reviewed. No pertinent surgical history.    Medical History reviewed. No pertinent medical history.     Social History   Substance Use Topics   • Smoking status: Not Asked   • Smokeless tobacco: Not Asked   • Alcohol use Not Asked       Review of Systems   Constitutional: Negative for activity change, appetite change, chills, fever and irritability.   HENT: Negative for congestion, ear discharge and sore throat.    Eyes: Negative for redness and itching.   Respiratory: Negative for cough and wheezing.    Cardiovascular: Negative for chest pain.   Gastrointestinal: Negative for abdominal pain, diarrhea and vomiting.   Skin: Negative for rash.        Positive for 1 insect bite on body which are pruritic and \"hot\"   Allergic/Immunologic: Negative for immunocompromised state.   Neurological: Negative for seizures, weakness and headaches.   Psychiatric/Behavioral: Negative for agitation.       Physical Exam     ED Triage Vitals [05/28/18 1610]   ED Triage Vitals Group      Temp 98.1 °F (36.7 °C)      Heart Rate 103      Resp 24      BP       SpO2 98 %      EtCO2 mmHg        Height 3' 3\" (0.991 m)      Weight (!) 48 lb 8 oz (22 kg)      Weight Scale Used        Physical Exam   Constitutional: Vital signs are normal. He appears well-developed and well-nourished. He is active, playful and easily engaged. He regards caregiver.  Non-toxic appearance. He does not have a sickly appearance. He does not appear ill. No distress.   Running around room, playful, NAD   HENT:   Head: Atraumatic. No signs of injury.   Mouth/Throat: Mucous membranes are moist. Oropharynx is clear.   Eyes: Conjunctivae and EOM are normal. Pupils are equal, round, and reactive to light.   Neck: Normal range of motion.   Pulmonary/Chest: Effort normal. No respiratory distress.   Musculoskeletal: Normal range of motion. He exhibits no tenderness.   Neurological: He is alert.   Skin: Skin is warm and dry. Capillary refill takes less than 3 seconds. No rash noted. No cyanosis. No pallor.   1 faint area of erythema to the lower back consistent with bug bite; no signs of infection   Nursing note and vitals reviewed.      ED Course     Procedures    ED Medication Orders     None          MDM    Vitals:    05/28/18 1610   Pulse: 103   Resp: 24   Temp: 98.1 °F (36.7 °C)   TempSrc: Oral   SpO2: 98%   Weight: (!) 22 kg   Height: 3' 3\" (0.991 m)       ED Course:  4:51 PM Initial Impression: I performed the initial assessment and evaluation of the patient. The pt presents to the ED with complaints of 1 insect bite to his lower back that he obtained while playing outside yesterday. explained to the pt's mother that the pt's sx are consistent with insect bites. I discussed with the pt's parents the plan of care with an Rx for Benadryl cream. I advised the pt's parents to have the pt f/u with his pediatrician if his sx last for longer than 1 week. The patient's parents understands to return to the ED in case of the development of any new or worsening sx. The pt's parents verbalizes understanding and agrees with the plan of care. All  questions and concerns were addressed at this time.      ED MDM    Critical Care time spent on this patient outside of billable procedures:  None      Clinical Impression  ED Diagnosis        Final diagnosis    Insect bite, initial encounter           Follow Up:  Non- Pcp    Schedule an appointment as soon as possible for a visit in 1 week  For follow up, if rash persists    [facility]  [address] [facility] [address]  [phone]  Go to  If symptoms worsen       New Prescriptions    DIPHENHYDRAMINE-ZINC ACETATE (BENADRYL EXTRA STRENGTH) 2-0.1 % CREAM    Apply topically 3 times daily as needed for Itching.       Pt is discharged in stable condition    I have reviewed the information recorded by the scribe for accuracy and agree with its contents.   Pedro Yung acting as scribe for Rola Luna PA-C  ____________________________________________________________________   Scribe: Pedro Yung  Physician Assistant: Rola Luna PA-C  Physician #: [id number]  Supervising Physician: Yassine Emerson DO  Physician #: [id number]  5/28/2018                      Rola Luna PA-C  05/28/18 1706       Rola Luna PA-C  05/28/18 1706

## 2019-12-27 ENCOUNTER — OFFICE VISIT (OUTPATIENT)
Dept: PSYCHOLOGY | Facility: CLINIC | Age: 24
End: 2019-12-27
Payer: COMMERCIAL

## 2019-12-27 DIAGNOSIS — F41.1 GAD (GENERALIZED ANXIETY DISORDER): Primary | ICD-10-CM

## 2019-12-27 DIAGNOSIS — F34.1 PERSISTENT DEPRESSIVE DISORDER: ICD-10-CM

## 2019-12-27 PROCEDURE — 90834 PSYTX W PT 45 MINUTES: CPT | Performed by: MARRIAGE & FAMILY THERAPIST

## 2019-12-27 ASSESSMENT — ANXIETY QUESTIONNAIRES
2. NOT BEING ABLE TO STOP OR CONTROL WORRYING: SEVERAL DAYS
5. BEING SO RESTLESS THAT IT IS HARD TO SIT STILL: SEVERAL DAYS
GAD7 TOTAL SCORE: 9
3. WORRYING TOO MUCH ABOUT DIFFERENT THINGS: MORE THAN HALF THE DAYS
7. FEELING AFRAID AS IF SOMETHING AWFUL MIGHT HAPPEN: NOT AT ALL
6. BECOMING EASILY ANNOYED OR IRRITABLE: MORE THAN HALF THE DAYS
1. FEELING NERVOUS, ANXIOUS, OR ON EDGE: MORE THAN HALF THE DAYS
GAD7 TOTAL SCORE: 9
4. TROUBLE RELAXING: SEVERAL DAYS
GAD7 TOTAL SCORE: 9
7. FEELING AFRAID AS IF SOMETHING AWFUL MIGHT HAPPEN: NOT AT ALL

## 2019-12-27 ASSESSMENT — PATIENT HEALTH QUESTIONNAIRE - PHQ9
SUM OF ALL RESPONSES TO PHQ QUESTIONS 1-9: 10
SUM OF ALL RESPONSES TO PHQ QUESTIONS 1-9: 10
10. IF YOU CHECKED OFF ANY PROBLEMS, HOW DIFFICULT HAVE THESE PROBLEMS MADE IT FOR YOU TO DO YOUR WORK, TAKE CARE OF THINGS AT HOME, OR GET ALONG WITH OTHER PEOPLE: SOMEWHAT DIFFICULT

## 2019-12-27 NOTE — PROGRESS NOTES
"                                           Progress Note    Patient Name: Tracy Jeffrey  Date: 12/27/19         Service Type: Individual  Video Visit: No     Session Start Time: 7:08  Session End Time: 8:00     Session Length: 38-52    Session #: 8    Attendees: Client attended alone     Treatment Plan Last Reviewed: 12/27/19, next due 3/27/20.  PHQ-9 / NIKOLAY-7 : completed.    DATA  Interactive Complexity: No  Crisis: No       Progress Since Last Session (Related to Symptoms / Goals / Homework):   Symptoms: Client reported decreased PHQ-9 and increased NIKOLAY-7 scores.    Homework: Achieved / completed to satisfaction       Episode of Care Goals: Minimal progress - ACTION (Actively working towards change); Intervened by reinforcing change plan / affirming steps taken     Current / Ongoing Stressors and Concerns:   Client reported having anxiety regarding the holidays and her relationship with her significant other's family, particularly his mother.  Client reported that she and her significant other have difficulties with setting boundaries with his mother, which leads to some conflict between the two of them.  Client reported wanting to \"fix\" things and spending significant time and energy in trying to appease her significant other's mother.     Treatment Objective(s) Addressed in This Session:   compile a list of boundaries that they would like to set with others. significant other/mother     Intervention:   Interpersonal Therapy: reviewed with client boundary-setting, especially time and energy she invests in \"fixing\" relationship issues.  Motivational Interviewing: used circular/Socratic questioning to elicit client's identification of her desired continued therapy goals.        ASSESSMENT: Current Emotional / Mental Status (status of significant symptoms):   Risk status (Self / Other harm or suicidal ideation)   Patient denies current fears or concerns for personal safety.   Patient denies current or recent " "suicidal ideation or behaviors.   Patientdenies current or recent homicidal ideation or behaviors.   Patient denies current or recent self injurious behavior or ideation.   Patient denies other safety concerns.   Patient Patient reports there has been no change in risk factors since their last session.     PatientPatient reports there has been no change in protective factors since their last session.     Recommended that patient call 911 or go to the local ED should there be a change in any of these risk factors.     Appearance:   Appropriate    Eye Contact:   Good    Psychomotor Behavior: Normal    Attitude:   Cooperative    Orientation:   All   Speech    Rate / Production: Normal     Volume:  Normal    Mood:    Anxious  Depressed  Normal   Affect:    Appropriate  Worrisome    Thought Content:  Clear    Thought Form:  Coherent  Logical    Insight:    Good      Medication Review:   No changes to current psychiatric medication(s)     Medication Compliance:   Yes     Changes in Health Issues:   None reported     Chemical Use Review:   Substance Use: Chemical use reviewed, no active concerns identified      Tobacco Use: No current tobacco use.      Diagnosis:  1. NIKOLAY (generalized anxiety disorder)    2. Persistent depressive disorder        Collateral Reports Completed:   Not Applicable    PLAN: (Patient Tasks / Therapist Tasks / Other)  Client agreed to work on boundary-setting regarding her investment in \"fixing\" relationship issues with her ex-significant other's family members between now and follow-up session in two weeks.        JEAN-PIERRE Mcgarry                                                         ______________________________________________________________________    Treatment Plan    Patient's Name: Tracy Jeffrey  YOB: 1995    Date: 12/27/19    DSM5 Diagnoses: 300.4 (F34.1) Persistent Depressive Disorder, Early onset or 300.02 (F41.1) Generalized Anxiety Disorder  Psychosocial " / Contextual Factors: problems with primary support group: family conflict, trauma history  WHODAS: 25    Referral / Collaboration:  Referral to another professional/service is not indicated at this time..    Anticipated number of session or this episode of care: 11-20      MeasurableTreatment Goal(s) related to diagnosis / functional impairment(s)  Goal 1: Client will successfully process through past trauma defined as reporting 0 Subjective Units of Distress related to trauma on 0-10 scale and 7 on Validity of (positive) Cognition scale about self on 1-7 scale   I will know I've met my goal when I am less impacted by my past trauma.       Objective #A (Client Action)    Status: Conitnued - Date: 12/27/19      Client will identify past traumatic events/memories which are causing current distress.     Intervention(s)  Therapist will take client's history and facilitate client's identification of targets for EMDR.     Objective #B  Client will complete needed assessment(s) and [facility] resourcing to confirm readiness for EMDR.    Status: Continued - Date: 12/27/19      Intervention(s)  Therapist will administer Dissociative Experiences Scale, Multidimensional Inventory of Dissociation as needed and complete [facility] resourcing with client.     Objective #C  Client will engage in installing at least 2 EMDR resources.  Status: Continued - Date: 12/27/19      Intervention(s)  Therapist will complete EMDR resourcing (Container, Remote Control, grounding/progressive muscle relaxation, Light Stream, Inner Advisor) with client.     Objective #D (Client Action)    Status: Continued - Date: 12/27/19      Client will engage in reprocessing all past traumatic event/memory targets.     Intervention(s)   Therapist will complete EMDR reprocessing with client.    Goal 2: Client will increase overall baseline calm mindset by 2 points on a 1-10 Likert scale per self-report (10 = optimal calm mindset).    I will know  I've met my goal when I feel less anxious.      Objective #A (Client Action)    Status: Continued - Date: 12/27/19     Client will use cognitive strategies identified in therapy to challenge anxious thoughts.    Intervention(s)  Therapist will teach emotional regulation skills. CBT/REBT ABCD model.    Objective #B  Client will use at least 2 new coping skills for anxiety management in the next 12 weeks.    Status: Continued - Date: 12/27/19     Intervention(s)  Therapist will teach emotional regulation skills. DBT Core Mindfulness, Distress Tolerance, Emotion Regulation, and Interpersonal Effectiveness skills.    Goal 3: Client will improve overall baseline mood by 2 points on a 1-10 Likert scale per self-report (10 = optimal mood).    I will know I've met my goal when I feel better/less depressed overall.      Objective #A (Client Action)    Status: Continued - Date:  12/27/19    Client will Identify negative self-talk and behaviors: challenge core beliefs, myths, and actions.    Intervention(s)  Therapist will teach emotional regulation skills. CBT/REBT ABCD model.    Objective #B  Client will Increase interest, engagement, and pleasure in doing things  Decrease frequency and intensity of feeling down, depressed, hopeless  Improve concentration, focus, and mindfulness in daily activities .    Status: Continued - Date:  12/27/19    Intervention(s)  Therapist will teach emotional regulation skills. DBT Core Mindfulness, Distress Tolerance, Emotion Regulation, and Interpersonal Effetiveness skills.    Goal 4:  Client will improve her interpersonal functioning in relationships by 2 points on a 1-10 Likert scale per self-report (10 = optimal interpersonal functioning).    I will know I've met my goal when my relationships have improved.      Objective #A (Client Action)    Status: Continued- Date: 12/27/19     Client will learn & utilize at least 2 assertive communication skills weekly.    Intervention(s)  Therapist  will teach assertiveness skills. Nonviolent Communication and DBT Interpersonal Effectiveness skills..    Objective #B  Client will practice setting boundaries 2 times in the next 12 weeks.    Status: Continued - Date: 12/27/19     Intervention(s)  Therapist will teach assertiveness skills. [place]-setting..    Patient has reviewed and agreed to the above plan.      JEAN-PIERRE Mcgarry  December 27, 2019

## 2019-12-28 ASSESSMENT — ANXIETY QUESTIONNAIRES: GAD7 TOTAL SCORE: 9

## 2019-12-28 ASSESSMENT — PATIENT HEALTH QUESTIONNAIRE - PHQ9: SUM OF ALL RESPONSES TO PHQ QUESTIONS 1-9: 10

## 2020-01-10 ENCOUNTER — OFFICE VISIT (OUTPATIENT)
Dept: PSYCHOLOGY | Facility: CLINIC | Age: 25
End: 2020-01-10
Payer: COMMERCIAL

## 2020-01-10 DIAGNOSIS — F41.1 GAD (GENERALIZED ANXIETY DISORDER): ICD-10-CM

## 2020-01-10 DIAGNOSIS — F34.1 PERSISTENT DEPRESSIVE DISORDER: Primary | ICD-10-CM

## 2020-01-10 PROCEDURE — 90834 PSYTX W PT 45 MINUTES: CPT | Performed by: MARRIAGE & FAMILY THERAPIST

## 2020-01-10 PROCEDURE — 90785 PSYTX COMPLEX INTERACTIVE: CPT | Performed by: MARRIAGE & FAMILY THERAPIST

## 2020-01-10 ASSESSMENT — ANXIETY QUESTIONNAIRES
GAD7 TOTAL SCORE: 6
2. NOT BEING ABLE TO STOP OR CONTROL WORRYING: NOT AT ALL
6. BECOMING EASILY ANNOYED OR IRRITABLE: SEVERAL DAYS
GAD7 TOTAL SCORE: 6
5. BEING SO RESTLESS THAT IT IS HARD TO SIT STILL: SEVERAL DAYS
7. FEELING AFRAID AS IF SOMETHING AWFUL MIGHT HAPPEN: NOT AT ALL
1. FEELING NERVOUS, ANXIOUS, OR ON EDGE: SEVERAL DAYS
GAD7 TOTAL SCORE: 6
7. FEELING AFRAID AS IF SOMETHING AWFUL MIGHT HAPPEN: NOT AT ALL
4. TROUBLE RELAXING: SEVERAL DAYS
3. WORRYING TOO MUCH ABOUT DIFFERENT THINGS: MORE THAN HALF THE DAYS

## 2020-01-10 NOTE — PROGRESS NOTES
Progress Note    Patient Name: Tracy Jeffrey  Date: 1/10/20         Service Type: Individual  Video Visit: No     Session Start Time: 8:11  Session End Time: 9:00     Session Length: 38-52    Session #: 9    Attendees: Client attended alone     Treatment Plan Last Reviewed: 12/27/19, next due 3/27/20.  PHQ-9 / NIKOLAY-7 : completed.    DATA  Interactive Complexity: Yes, visit entailed Interactive Complexity evidenced by:  -Use of play equipment, physical devices,  or  to overcome barriers to diagnostic or therapeutic interaction with a patient who is not fluent in the same language or who has not developed or lost expressive or receptive language skills to use or understand typical language  Crisis: No       Progress Since Last Session (Related to Symptoms / Goals / Homework):   Symptoms: Client reported increased PHQ-9 and decreased NIKOLAY-7 scores.    Homework: Partially completed       Episode of Care Goals: Satisfactory progress - ACTION (Actively working towards change); Intervened by reinforcing change plan / affirming steps taken     Current / Ongoing Stressors and Concerns:   Client reported experiencing some increased depressive symptoms related to her job/satisfaction.  Client reported that she continues to experience distress related to her ex-significant other.  Client opted to engage in continued EMDR reprocessing of said relationship.     Treatment Objective(s) Addressed in This Session:   Client will engage in reprocessing all past traumatic event/memory targets.     Intervention:   EMDR: reprocessed client's relationship with her ex-significant other (complete).        ASSESSMENT: Current Emotional / Mental Status (status of significant symptoms):   Risk status (Self / Other harm or suicidal ideation)   Patient denies current fears or concerns for personal safety.   Patient denies current or recent suicidal ideation or  behaviors.   Patientdenies current or recent homicidal ideation or behaviors.   Patient denies current or recent self injurious behavior or ideation.   Patient denies other safety concerns.   Patient Patient reports there has been no change in risk factors since their last session.     PatientPatient reports there has been no change in protective factors since their last session.     Recommended that patient call 911 or go to the local ED should there be a change in any of these risk factors.     Appearance:   Appropriate    Eye Contact:   Good    Psychomotor Behavior: Normal    Attitude:   Cooperative    Orientation:   All   Speech    Rate / Production: Normal     Volume:  Normal    Mood:    Anxious  Depressed  Normal   Affect:    Appropriate  Worrisome    Thought Content:  Clear    Thought Form:  Coherent  Logical    Insight:    Good      Medication Review:   No changes to current psychiatric medication(s)     Medication Compliance:   Yes     Changes in Health Issues:   None reported     Chemical Use Review:   Substance Use: Chemical use reviewed, no active concerns identified      Tobacco Use: No current tobacco use.      Diagnosis:  1. Persistent depressive disorder    2. NIKOLAY (generalized anxiety disorder)        Collateral Reports Completed:   Not Applicable    PLAN: (Patient Tasks / Therapist Tasks / Other)  Client agreed to continue to reprocess her relationship wiht her ex-significant other, using EMDR resources as needed for additional distress between now and follow-up session in three weeks.        Elliot Moreno, LMFT                                                         ______________________________________________________________________    Treatment Plan    Patient's Name: Tracy Jeffrey  YOB: 1995    Date: 12/27/19    DSM5 Diagnoses: 300.4 (F34.1) Persistent Depressive Disorder, Early onset or 300.02 (F41.1) Generalized Anxiety Disorder  Psychosocial / Contextual Factors:  problems with primary support group: family conflict, trauma history  WHODAS: 25    Referral / Collaboration:  Referral to another professional/service is not indicated at this time..    Anticipated number of session or this episode of care: 11-20      MeasurableTreatment Goal(s) related to diagnosis / functional impairment(s)  Goal 1: Client will successfully process through past trauma defined as reporting 0 Subjective Units of Distress related to trauma on 0-10 scale and 7 on Validity of (positive) Cognition scale about self on 1-7 scale   I will know I've met my goal when I am less impacted by my past trauma.       Objective #A (Client Action)    Status: Conitnued - Date: 12/27/19      Client will identify past traumatic events/memories which are causing current distress.     Intervention(s)  Therapist will take client's history and facilitate client's identification of targets for EMDR.     Objective #B  Client will complete needed assessment(s) and [facility] resourcing to confirm readiness for EMDR.    Status: Continued - Date: 12/27/19      Intervention(s)  Therapist will administer Dissociative Experiences Scale, Multidimensional Inventory of Dissociation as needed and complete [facility] resourcing with client.     Objective #C  Client will engage in installing at least 2 EMDR resources.  Status: Continued - Date: 12/27/19      Intervention(s)  Therapist will complete EMDR resourcing (Container, Remote Control, grounding/progressive muscle relaxation, Light Stream, Inner Advisor) with client.     Objective #D (Client Action)    Status: Continued - Date: 12/27/19      Client will engage in reprocessing all past traumatic event/memory targets.     Intervention(s)   Therapist will complete EMDR reprocessing with client.    Goal 2: Client will increase overall baseline calm mindset by 2 points on a 1-10 Likert scale per self-report (10 = optimal calm mindset).    I will know I've met my goal when I  feel less anxious.      Objective #A (Client Action)    Status: Continued - Date: 12/27/19     Client will use cognitive strategies identified in therapy to challenge anxious thoughts.    Intervention(s)  Therapist will teach emotional regulation skills. CBT/REBT ABCD model.    Objective #B  Client will use at least 2 new coping skills for anxiety management in the next 12 weeks.    Status: Continued - Date: 12/27/19     Intervention(s)  Therapist will teach emotional regulation skills. DBT Core Mindfulness, Distress Tolerance, Emotion Regulation, and Interpersonal Effectiveness skills.    Goal 3: Client will improve overall baseline mood by 2 points on a 1-10 Likert scale per self-report (10 = optimal mood).    I will know I've met my goal when I feel better/less depressed overall.      Objective #A (Client Action)    Status: Continued - Date:  12/27/19    Client will Identify negative self-talk and behaviors: challenge core beliefs, myths, and actions.    Intervention(s)  Therapist will teach emotional regulation skills. CBT/REBT ABCD model.    Objective #B  Client will Increase interest, engagement, and pleasure in doing things  Decrease frequency and intensity of feeling down, depressed, hopeless  Improve concentration, focus, and mindfulness in daily activities .    Status: Continued - Date:  12/27/19    Intervention(s)  Therapist will teach emotional regulation skills. DBT Core Mindfulness, Distress Tolerance, Emotion Regulation, and Interpersonal Effetiveness skills.    Goal 4:  Client will improve her interpersonal functioning in relationships by 2 points on a 1-10 Likert scale per self-report (10 = optimal interpersonal functioning).    I will know I've met my goal when my relationships have improved.      Objective #A (Client Action)    Status: Continued- Date: 12/27/19     Client will learn & utilize at least 2 assertive communication skills weekly.    Intervention(s)  Therapist will teach assertiveness  skills. Nonviolent Communication and DBT Interpersonal Effectiveness skills..    Objective #B  Client will practice setting boundaries 2 times in the next 12 weeks.    Status: Continued - Date: 12/27/19     Intervention(s)  Therapist will teach assertiveness skills. [place]-setting..    Patient has reviewed and agreed to the above plan.      JEAN-PIERRE Mcgarry  January 10, 2020

## 2020-01-11 ASSESSMENT — ANXIETY QUESTIONNAIRES: GAD7 TOTAL SCORE: 6

## 2020-01-11 ASSESSMENT — PATIENT HEALTH QUESTIONNAIRE - PHQ9: SUM OF ALL RESPONSES TO PHQ QUESTIONS 1-9: 11

## 2020-02-12 ENCOUNTER — OFFICE VISIT (OUTPATIENT)
Dept: PSYCHOLOGY | Facility: CLINIC | Age: 25
End: 2020-02-12
Payer: COMMERCIAL

## 2020-02-12 DIAGNOSIS — F34.1 PERSISTENT DEPRESSIVE DISORDER: ICD-10-CM

## 2020-02-12 DIAGNOSIS — F41.1 GAD (GENERALIZED ANXIETY DISORDER): Primary | ICD-10-CM

## 2020-02-12 PROCEDURE — 90834 PSYTX W PT 45 MINUTES: CPT | Performed by: MARRIAGE & FAMILY THERAPIST

## 2020-02-12 ASSESSMENT — ANXIETY QUESTIONNAIRES
GAD7 TOTAL SCORE: 6
GAD7 TOTAL SCORE: 6
7. FEELING AFRAID AS IF SOMETHING AWFUL MIGHT HAPPEN: NOT AT ALL
6. BECOMING EASILY ANNOYED OR IRRITABLE: SEVERAL DAYS
5. BEING SO RESTLESS THAT IT IS HARD TO SIT STILL: NOT AT ALL
GAD7 TOTAL SCORE: 6
4. TROUBLE RELAXING: SEVERAL DAYS
2. NOT BEING ABLE TO STOP OR CONTROL WORRYING: SEVERAL DAYS
1. FEELING NERVOUS, ANXIOUS, OR ON EDGE: MORE THAN HALF THE DAYS
7. FEELING AFRAID AS IF SOMETHING AWFUL MIGHT HAPPEN: NOT AT ALL
3. WORRYING TOO MUCH ABOUT DIFFERENT THINGS: SEVERAL DAYS

## 2020-02-12 ASSESSMENT — PATIENT HEALTH QUESTIONNAIRE - PHQ9
SUM OF ALL RESPONSES TO PHQ QUESTIONS 1-9: 8
SUM OF ALL RESPONSES TO PHQ QUESTIONS 1-9: 8
10. IF YOU CHECKED OFF ANY PROBLEMS, HOW DIFFICULT HAVE THESE PROBLEMS MADE IT FOR YOU TO DO YOUR WORK, TAKE CARE OF THINGS AT HOME, OR GET ALONG WITH OTHER PEOPLE: SOMEWHAT DIFFICULT

## 2020-02-12 NOTE — PROGRESS NOTES
"                                           Progress Note    Patient Name: Tracy Jeffrey  Date: 2/12/20         Service Type: Individual  Video Visit: No     Session Start Time: 7:13  Session End Time: 8:00     Session Length: 38-52    Session #: 10    Attendees: Client attended alone     Treatment Plan Last Reviewed: 12/27/19, next due 3/27/20.  PHQ-9 / NIKOLAY-7 : completed.    DATA  Interactive Complexity: No  Crisis: No       Progress Since Last Session (Related to Symptoms / Goals / Homework):   Symptoms: Client reported mild depression and anxiety.    Homework: Partially completed       Episode of Care Goals: Satisfactory progress - ACTION (Actively working towards change); Intervened by reinforcing change plan / affirming steps taken     Current / Ongoing Stressors and Concerns:   Client reported experiencing increased anxiety related to her job.  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  Client reported that she is significantly behind in her paperwork, and that she continues to struggle with finding motivation to do it.     Treatment Objective(s) Addressed in This Session:   use cognitive strategies identified in therapy to challenge anxious thoughts  Increase interest, engagement, and pleasure in doing things  Identify negative self-talk and behaviors: challenge core beliefs, myths, and actions  Improve concentration, focus, and mindfulness in daily activities      Intervention:   CBT: reviewed with client reframining \"What if...?\" questions into \"If..., then...\" statements to work through her anxiety, and \"acting as if\" she has motivation rather than waiting for motivation to come.        ASSESSMENT: Current Emotional / Mental Status (status of significant symptoms):   Risk status (Self / Other harm or suicidal ideation)   Patient denies current fears or concerns for personal safety.   Patient denies current or recent " "suicidal ideation or behaviors.   Patientdenies current or recent homicidal ideation or behaviors.   Patient denies current or recent self injurious behavior or ideation.   Patient denies other safety concerns.   Patient Patient reports there has been no change in risk factors since their last session.     PatientPatient reports there has been no change in protective factors since their last session.     Recommended that patient call 911 or go to the local ED should there be a change in any of these risk factors.     Appearance:   Appropriate    Eye Contact:   Good    Psychomotor Behavior: Normal    Attitude:   Cooperative    Orientation:   All   Speech    Rate / Production: Normal     Volume:  Normal    Mood:    Anxious  Depressed  Normal   Affect:    Appropriate  Worrisome    Thought Content:  Clear    Thought Form:  Coherent  Logical    Insight:    Good      Medication Review:   No changes to current psychiatric medication(s)     Medication Compliance:   Yes     Changes in Health Issues:   None reported     Chemical Use Review:   Substance Use: Chemical use reviewed, no active concerns identified      Tobacco Use: No current tobacco use.      Diagnosis:  1. NIKOLAY (generalized anxiety disorder)    2. Persistent depressive disorder        Collateral Reports Completed:   Not Applicable    PLAN: (Patient Tasks / Therapist Tasks / Other)  Client agreed to work on reframing her \"What if...?\" questions into \"If..., then...\" statements to work through her anxiety, and to \"act as if\" she has motivation rather than waiting to motivation to come.        Elliot Moreno, FERFT                                                         ______________________________________________________________________    Treatment Plan    Patient's Name: Tracy Jeffrey  YOB: 1995    Date: 12/27/19    DSM5 Diagnoses: 300.4 (F34.1) Persistent Depressive Disorder, Early onset or 300.02 (F41.1) Generalized Anxiety " Disorder  Psychosocial / Contextual Factors: problems with primary support group: family conflict, trauma history  WHODAS: 25    Referral / Collaboration:  Referral to another professional/service is not indicated at this time..    Anticipated number of session or this episode of care: 11-20      MeasurableTreatment Goal(s) related to diagnosis / functional impairment(s)  Goal 1: Client will successfully process through past trauma defined as reporting 0 Subjective Units of Distress related to trauma on 0-10 scale and 7 on Validity of (positive) Cognition scale about self on 1-7 scale   I will know I've met my goal when I am less impacted by my past trauma.       Objective #A (Client Action)    Status: Conitnued - Date: 12/27/19      Client will identify past traumatic events/memories which are causing current distress.     Intervention(s)  Therapist will take client's history and facilitate client's identification of targets for EMDR.     Objective #B  Client will complete needed assessment(s) and [facility] resourcing to confirm readiness for EMDR.    Status: Continued - Date: 12/27/19      Intervention(s)  Therapist will administer Dissociative Experiences Scale, Multidimensional Inventory of Dissociation as needed and complete [facility] resourcing with client.     Objective #C  Client will engage in installing at least 2 EMDR resources.  Status: Continued - Date: 12/27/19      Intervention(s)  Therapist will complete EMDR resourcing (Container, Remote Control, grounding/progressive muscle relaxation, Light Stream, Inner Advisor) with client.     Objective #D (Client Action)    Status: Continued - Date: 12/27/19      Client will engage in reprocessing all past traumatic event/memory targets.     Intervention(s)   Therapist will complete EMDR reprocessing with client.    Goal 2: Client will increase overall baseline calm mindset by 2 points on a 1-10 Likert scale per self-report (10 = optimal calm  mindset).    I will know I've met my goal when I feel less anxious.      Objective #A (Client Action)    Status: Continued - Date: 12/27/19     Client will use cognitive strategies identified in therapy to challenge anxious thoughts.    Intervention(s)  Therapist will teach emotional regulation skills. CBT/REBT ABCD model.    Objective #B  Client will use at least 2 new coping skills for anxiety management in the next 12 weeks.    Status: Continued - Date: 12/27/19     Intervention(s)  Therapist will teach emotional regulation skills. DBT Core Mindfulness, Distress Tolerance, Emotion Regulation, and Interpersonal Effectiveness skills.    Goal 3: Client will improve overall baseline mood by 2 points on a 1-10 Likert scale per self-report (10 = optimal mood).    I will know I've met my goal when I feel better/less depressed overall.      Objective #A (Client Action)    Status: Continued - Date:  12/27/19    Client will Identify negative self-talk and behaviors: challenge core beliefs, myths, and actions.    Intervention(s)  Therapist will teach emotional regulation skills. CBT/REBT ABCD model.    Objective #B  Client will Increase interest, engagement, and pleasure in doing things  Decrease frequency and intensity of feeling down, depressed, hopeless  Improve concentration, focus, and mindfulness in daily activities .    Status: Continued - Date:  12/27/19    Intervention(s)  Therapist will teach emotional regulation skills. DBT Core Mindfulness, Distress Tolerance, Emotion Regulation, and Interpersonal Effetiveness skills.    Goal 4:  Client will improve her interpersonal functioning in relationships by 2 points on a 1-10 Likert scale per self-report (10 = optimal interpersonal functioning).    I will know I've met my goal when my relationships have improved.      Objective #A (Client Action)    Status: Continued- Date: 12/27/19     Client will learn & utilize at least 2 assertive communication skills  weekly.    Intervention(s)  Therapist will teach assertiveness skills. Nonviolent Communication and DBT Interpersonal Effectiveness skills..    Objective #B  Client will practice setting boundaries 2 times in the next 12 weeks.    Status: Continued - Date: 12/27/19     Intervention(s)  Therapist will teach assertiveness skills. [place]-setting..    Patient has reviewed and agreed to the above plan.      JEAN-PIERRE Mcgarry  February 12, 2020

## 2020-02-13 ASSESSMENT — PATIENT HEALTH QUESTIONNAIRE - PHQ9: SUM OF ALL RESPONSES TO PHQ QUESTIONS 1-9: 8

## 2020-02-13 ASSESSMENT — ANXIETY QUESTIONNAIRES: GAD7 TOTAL SCORE: 6

## 2020-02-26 ENCOUNTER — OFFICE VISIT (OUTPATIENT)
Dept: PSYCHOLOGY | Facility: CLINIC | Age: 25
End: 2020-02-26
Payer: COMMERCIAL

## 2020-02-26 DIAGNOSIS — F41.1 GAD (GENERALIZED ANXIETY DISORDER): Primary | ICD-10-CM

## 2020-02-26 DIAGNOSIS — F34.1 PERSISTENT DEPRESSIVE DISORDER: ICD-10-CM

## 2020-02-26 PROCEDURE — 90834 PSYTX W PT 45 MINUTES: CPT | Performed by: MARRIAGE & FAMILY THERAPIST

## 2020-02-26 ASSESSMENT — ANXIETY QUESTIONNAIRES
1. FEELING NERVOUS, ANXIOUS, OR ON EDGE: MORE THAN HALF THE DAYS
5. BEING SO RESTLESS THAT IT IS HARD TO SIT STILL: SEVERAL DAYS
GAD7 TOTAL SCORE: 7
GAD7 TOTAL SCORE: 7
4. TROUBLE RELAXING: SEVERAL DAYS
3. WORRYING TOO MUCH ABOUT DIFFERENT THINGS: MORE THAN HALF THE DAYS
2. NOT BEING ABLE TO STOP OR CONTROL WORRYING: NOT AT ALL
7. FEELING AFRAID AS IF SOMETHING AWFUL MIGHT HAPPEN: NOT AT ALL
7. FEELING AFRAID AS IF SOMETHING AWFUL MIGHT HAPPEN: NOT AT ALL
GAD7 TOTAL SCORE: 7
6. BECOMING EASILY ANNOYED OR IRRITABLE: SEVERAL DAYS

## 2020-02-26 ASSESSMENT — PATIENT HEALTH QUESTIONNAIRE - PHQ9
10. IF YOU CHECKED OFF ANY PROBLEMS, HOW DIFFICULT HAVE THESE PROBLEMS MADE IT FOR YOU TO DO YOUR WORK, TAKE CARE OF THINGS AT HOME, OR GET ALONG WITH OTHER PEOPLE: SOMEWHAT DIFFICULT
SUM OF ALL RESPONSES TO PHQ QUESTIONS 1-9: 8
SUM OF ALL RESPONSES TO PHQ QUESTIONS 1-9: 8

## 2020-02-26 NOTE — PROGRESS NOTES
"                                           Progress Note    Patient Name: Tracy Jeffrey  Date: 2/26/20         Service Type: Individual  Video Visit: No     Session Start Time: 7:10  Session End Time: 8:00     Session Length: 38-52    Session #: 11    Attendees: Client attended alone     Treatment Plan Last Reviewed: 12/27/19, next due 3/27/20.  PHQ-9 / NIKOLAY-7 : completed.    DATA  Interactive Complexity: No  Crisis: No       Progress Since Last Session (Related to Symptoms / Goals / Homework):   Symptoms: Worsening client reported increased NIKOLAY-7 score.    Homework: Partially completed       Episode of Care Goals: Minimal progress - ACTION (Actively working towards change); Intervened by reinforcing change plan / affirming steps taken     Current / Ongoing Stressors and Concerns:   Client reported experiencing increased anxiety related to getting more actively involved in pursuing what she needs to do, and feeling overwhelmed by it.  Client reported regarding her concerns about her relationship with her significant other, wanting to avoid past relationship conflicts, and planning for parenthood.     Treatment Objective(s) Addressed in This Session:   use at least some coping skills for anxiety management in the next few weeks     Intervention:   DBT: reviewed with client effectively considering her relationship with her significant other and planning while challenging notions of \"perfection\" and \"being ready.\"        ASSESSMENT: Current Emotional / Mental Status (status of significant symptoms):   Risk status (Self / Other harm or suicidal ideation)   Patient denies current fears or concerns for personal safety.   Patient denies current or recent suicidal ideation or behaviors.   Patientdenies current or recent homicidal ideation or behaviors.   Patient denies current or recent self injurious behavior or ideation.   Patient denies other safety concerns.   Patient Patient reports there has been no change in risk " "factors since their last session.     PatientPatient reports there has been no change in protective factors since their last session.     Recommended that patient call 911 or go to the local ED should there be a change in any of these risk factors.     Appearance:   Appropriate    Eye Contact:   Good    Psychomotor Behavior: Normal    Attitude:   Cooperative    Orientation:   All   Speech    Rate / Production: Normal     Volume:  Normal    Mood:    Anxious  Normal   Affect:    Appropriate  Worrisome    Thought Content:  Clear    Thought Form:  Coherent  Logical    Insight:    Good      Medication Review:   No changes to current psychiatric medication(s)     Medication Compliance:   Yes     Changes in Health Issues:   None reported     Chemical Use Review:   Substance Use: Chemical use reviewed, no active concerns identified      Tobacco Use: No current tobacco use.      Diagnosis:  1. NIKOLAY (generalized anxiety disorder)    2. Persistent depressive disorder        Collateral Reports Completed:   Not Applicable    PLAN: (Patient Tasks / Therapist Tasks / Other)  Client agreed to work on effectively considering her relationship with her significant other and life planning while challenging notions of \"perfection\" and \"being ready.\"        Elliot Moreno, [facility]                                                         ______________________________________________________________________    Treatment Plan    Patient's Name: Tracy Jeffrey  YOB: 1995    Date: 12/27/19    DSM5 Diagnoses: 300.4 (F34.1) Persistent Depressive Disorder, Early onset or 300.02 (F41.1) Generalized Anxiety Disorder  Psychosocial / Contextual Factors: problems with primary support group: family conflict, trauma history  WHODAS: 25    Referral / Collaboration:  Referral to another professional/service is not indicated at this time..    Anticipated number of session or this episode of care: 11-20      MeasurableTreatment Goal(s) " related to diagnosis / functional impairment(s)  Goal 1: Client will successfully process through past trauma defined as reporting 0 Subjective Units of Distress related to trauma on 0-10 scale and 7 on Validity of (positive) Cognition scale about self on 1-7 scale   I will know I've met my goal when I am less impacted by my past trauma.       Objective #A (Client Action)    Status: Conitnued - Date: 12/27/19      Client will identify past traumatic events/memories which are causing current distress.     Intervention(s)  Therapist will take client's history and facilitate client's identification of targets for EMDR.     Objective #B  Client will complete needed assessment(s) and [facility] resourcing to confirm readiness for EMDR.    Status: Continued - Date: 12/27/19      Intervention(s)  Therapist will administer Dissociative Experiences Scale, Multidimensional Inventory of Dissociation as needed and complete [facility] resourcing with client.     Objective #C  Client will engage in installing at least 2 EMDR resources.  Status: Continued - Date: 12/27/19      Intervention(s)  Therapist will complete EMDR resourcing (Container, Remote Control, grounding/progressive muscle relaxation, Light Stream, Inner Advisor) with client.     Objective #D (Client Action)    Status: Continued - Date: 12/27/19      Client will engage in reprocessing all past traumatic event/memory targets.     Intervention(s)   Therapist will complete EMDR reprocessing with client.    Goal 2: Client will increase overall baseline calm mindset by 2 points on a 1-10 Likert scale per self-report (10 = optimal calm mindset).    I will know I've met my goal when I feel less anxious.      Objective #A (Client Action)    Status: Continued - Date: 12/27/19     Client will use cognitive strategies identified in therapy to challenge anxious thoughts.    Intervention(s)  Therapist will teach emotional regulation skills. CBT/REBT ABCD  model.    Objective #B  Client will use at least 2 new coping skills for anxiety management in the next 12 weeks.    Status: Continued - Date: 12/27/19     Intervention(s)  Therapist will teach emotional regulation skills. DBT Core Mindfulness, Distress Tolerance, Emotion Regulation, and Interpersonal Effectiveness skills.    Goal 3: Client will improve overall baseline mood by 2 points on a 1-10 Likert scale per self-report (10 = optimal mood).    I will know I've met my goal when I feel better/less depressed overall.      Objective #A (Client Action)    Status: Continued - Date:  12/27/19    Client will Identify negative self-talk and behaviors: challenge core beliefs, myths, and actions.    Intervention(s)  Therapist will teach emotional regulation skills. CBT/REBT ABCD model.    Objective #B  Client will Increase interest, engagement, and pleasure in doing things  Decrease frequency and intensity of feeling down, depressed, hopeless  Improve concentration, focus, and mindfulness in daily activities .    Status: Continued - Date:  12/27/19    Intervention(s)  Therapist will teach emotional regulation skills. DBT Core Mindfulness, Distress Tolerance, Emotion Regulation, and Interpersonal Effetiveness skills.    Goal 4:  Client will improve her interpersonal functioning in relationships by 2 points on a 1-10 Likert scale per self-report (10 = optimal interpersonal functioning).    I will know I've met my goal when my relationships have improved.      Objective #A (Client Action)    Status: Continued- Date: 12/27/19     Client will learn & utilize at least 2 assertive communication skills weekly.    Intervention(s)  Therapist will teach assertiveness skills. Nonviolent Communication and DBT Interpersonal Effectiveness skills..    Objective #B  Client will practice setting boundaries 2 times in the next 12 weeks.    Status: Continued - Date: 12/27/19     Intervention(s)  Therapist will teach assertiveness skills.  [place]-setting..    Patient has reviewed and agreed to the above plan.      Elliot Moreno, LMFT  February 26, 2020

## 2020-02-27 ASSESSMENT — ANXIETY QUESTIONNAIRES: GAD7 TOTAL SCORE: 7

## 2020-02-27 ASSESSMENT — PATIENT HEALTH QUESTIONNAIRE - PHQ9: SUM OF ALL RESPONSES TO PHQ QUESTIONS 1-9: 8

## 2020-03-10 ENCOUNTER — OFFICE VISIT (OUTPATIENT)
Dept: PSYCHOLOGY | Facility: CLINIC | Age: 25
End: 2020-03-10
Payer: COMMERCIAL

## 2020-03-10 DIAGNOSIS — F41.1 GAD (GENERALIZED ANXIETY DISORDER): Primary | ICD-10-CM

## 2020-03-10 DIAGNOSIS — F34.1 PERSISTENT DEPRESSIVE DISORDER: ICD-10-CM

## 2020-03-10 PROCEDURE — 90834 PSYTX W PT 45 MINUTES: CPT | Performed by: MARRIAGE & FAMILY THERAPIST

## 2020-03-10 NOTE — PROGRESS NOTES
"                                           Progress Note    Patient Name: Tracy Jeffrey  Date: 3/10/20         Service Type: Individual  Video Visit: No     Session Start Time: 7:11  Session End Time: 8:00     Session Length: 38-52    Session #: 12    Attendees: Client attended alone     Treatment Plan Last Reviewed: 12/27/19, next due 3/27/20.  PHQ-9 / NIKOLAY-7 : completed.    DATA  Interactive Complexity: No  Crisis: No       Progress Since Last Session (Related to Symptoms / Goals / Homework):   Symptoms: No change client is stable.    Homework: Partially completed       Episode of Care Goals: Minimal progress - ACTION (Actively working towards change); Intervened by reinforcing change plan / affirming steps taken     Current / Ongoing Stressors and Concerns:   Client reported continuing to experience high anxiety regarding the things she needs to do, particularly paperwork for her job, on which she is currently significantly past-due.  Client reported struggling with translating her high level of insight and self-awareness into behavioral change.     Treatment Objective(s) Addressed in This Session:   use cognitive strategies identified in therapy to challenge anxious thoughts     Intervention:   CBT: reviewed with client challenging notion of \"waiting for motivation to strike\" and using mental \"gimmicks\" and accountability partners to work on things she needs to do one piece at a time.        ASSESSMENT: Current Emotional / Mental Status (status of significant symptoms):   Risk status (Self / Other harm or suicidal ideation)   Patient denies current fears or concerns for personal safety.   Patient denies current or recent suicidal ideation or behaviors.   Patientdenies current or recent homicidal ideation or behaviors.   Patient denies current or recent self injurious behavior or ideation.   Patient denies other safety concerns.   Patient Patient reports there has been no change in risk factors since their last " "session.     PatientPatient reports there has been no change in protective factors since their last session.     Recommended that patient call 911 or go to the local ED should there be a change in any of these risk factors.     Appearance:   Appropriate    Eye Contact:   Good    Psychomotor Behavior: Normal    Attitude:   Cooperative    Orientation:   All   Speech    Rate / Production: Normal     Volume:  Normal    Mood:    Anxious  Normal   Affect:    Appropriate  Worrisome    Thought Content:  Clear    Thought Form:  Coherent  Logical    Insight:    Good      Medication Review:   No changes to current psychiatric medication(s)     Medication Compliance:   Yes     Changes in Health Issues:   None reported     Chemical Use Review:   Substance Use: Chemical use reviewed, no active concerns identified      Tobacco Use: No current tobacco use.      Diagnosis:  1. NIKOLAY (generalized anxiety disorder)    2. Persistent depressive disorder        Collateral Reports Completed:   Not Applicable    PLAN: (Patient Tasks / Therapist Tasks / Other)  Client agreed to work on challenging notion of \"waiting for motivation to strike\" and using taught skills/strategies of mental \"gimmicks\" and accountability partners to increase her productivity.        Elliot Moreno, [facility]                                                         ______________________________________________________________________    Treatment Plan    Patient's Name: Tracy Jeffrey  YOB: 1995    Date: 12/27/19    DSM5 Diagnoses: 300.4 (F34.1) Persistent Depressive Disorder, Early onset or 300.02 (F41.1) Generalized Anxiety Disorder  Psychosocial / Contextual Factors: problems with primary support group: family conflict, trauma history  WHODAS: 25    Referral / Collaboration:  Referral to another professional/service is not indicated at this time..    Anticipated number of session or this episode of care: 11-20      MeasurableTreatment Goal(s) " related to diagnosis / functional impairment(s)  Goal 1: Client will successfully process through past trauma defined as reporting 0 Subjective Units of Distress related to trauma on 0-10 scale and 7 on Validity of (positive) Cognition scale about self on 1-7 scale   I will know I've met my goal when I am less impacted by my past trauma.       Objective #A (Client Action)    Status: Conitnued - Date: 12/27/19      Client will identify past traumatic events/memories which are causing current distress.     Intervention(s)  Therapist will take client's history and facilitate client's identification of targets for EMDR.     Objective #B  Client will complete needed assessment(s) and [facility] resourcing to confirm readiness for EMDR.    Status: Continued - Date: 12/27/19      Intervention(s)  Therapist will administer Dissociative Experiences Scale, Multidimensional Inventory of Dissociation as needed and complete [facility] resourcing with client.     Objective #C  Client will engage in installing at least 2 EMDR resources.  Status: Continued - Date: 12/27/19      Intervention(s)  Therapist will complete EMDR resourcing (Container, Remote Control, grounding/progressive muscle relaxation, Light Stream, Inner Advisor) with client.     Objective #D (Client Action)    Status: Continued - Date: 12/27/19      Client will engage in reprocessing all past traumatic event/memory targets.     Intervention(s)   Therapist will complete EMDR reprocessing with client.    Goal 2: Client will increase overall baseline calm mindset by 2 points on a 1-10 Likert scale per self-report (10 = optimal calm mindset).    I will know I've met my goal when I feel less anxious.      Objective #A (Client Action)    Status: Continued - Date: 12/27/19     Client will use cognitive strategies identified in therapy to challenge anxious thoughts.    Intervention(s)  Therapist will teach emotional regulation skills. CBT/REBT ABCD  model.    Objective #B  Client will use at least 2 new coping skills for anxiety management in the next 12 weeks.    Status: Continued - Date: 12/27/19     Intervention(s)  Therapist will teach emotional regulation skills. DBT Core Mindfulness, Distress Tolerance, Emotion Regulation, and Interpersonal Effectiveness skills.    Goal 3: Client will improve overall baseline mood by 2 points on a 1-10 Likert scale per self-report (10 = optimal mood).    I will know I've met my goal when I feel better/less depressed overall.      Objective #A (Client Action)    Status: Continued - Date:  12/27/19    Client will Identify negative self-talk and behaviors: challenge core beliefs, myths, and actions.    Intervention(s)  Therapist will teach emotional regulation skills. CBT/REBT ABCD model.    Objective #B  Client will Increase interest, engagement, and pleasure in doing things  Decrease frequency and intensity of feeling down, depressed, hopeless  Improve concentration, focus, and mindfulness in daily activities .    Status: Continued - Date:  12/27/19    Intervention(s)  Therapist will teach emotional regulation skills. DBT Core Mindfulness, Distress Tolerance, Emotion Regulation, and Interpersonal Effetiveness skills.    Goal 4:  Client will improve her interpersonal functioning in relationships by 2 points on a 1-10 Likert scale per self-report (10 = optimal interpersonal functioning).    I will know I've met my goal when my relationships have improved.      Objective #A (Client Action)    Status: Continued- Date: 12/27/19     Client will learn & utilize at least 2 assertive communication skills weekly.    Intervention(s)  Therapist will teach assertiveness skills. Nonviolent Communication and DBT Interpersonal Effectiveness skills..    Objective #B  Client will practice setting boundaries 2 times in the next 12 weeks.    Status: Continued - Date: 12/27/19     Intervention(s)  Therapist will teach assertiveness skills.  [place]-setting..    Patient has reviewed and agreed to the above plan.      Elliot Moreno, LMFT  March 10, 2020

## 2020-03-11 ASSESSMENT — PATIENT HEALTH QUESTIONNAIRE - PHQ9: SUM OF ALL RESPONSES TO PHQ QUESTIONS 1-9: 8

## 2020-03-20 DIAGNOSIS — F33.1 MODERATE EPISODE OF RECURRENT MAJOR DEPRESSIVE DISORDER (H): ICD-10-CM

## 2020-03-20 DIAGNOSIS — F41.1 GAD (GENERALIZED ANXIETY DISORDER): ICD-10-CM

## 2020-03-20 RX ORDER — BUPROPION HYDROCHLORIDE 150 MG/1
TABLET ORAL
Qty: 90 TABLET | Refills: 0 | Status: SHIPPED | OUTPATIENT
Start: 2020-03-20 | End: 2020-07-08

## 2020-03-20 NOTE — TELEPHONE ENCOUNTER
"Requested Prescriptions   Pending Prescriptions Disp Refills     buPROPion (WELLBUTRIN XL) 150 MG 24 hr tablet [Pharmacy Med Name: BUPROPION HCL  MG TABLET] 90 tablet 0     Sig: TAKE 1 TABLET BY MOUTH EVERY DAY IN THE MORNING       SSRIs Protocol Failed - 3/20/2020  1:56 AM        Failed - PHQ-9 score less than 5 in past 6 months     Please review last PHQ-9 score.           Passed - Medication is Bupropion     If the medication is Bupropion (Wellbutrin), and the patient is taking for smoking cessation; OK to refill.          Passed - Medication is active on med list        Passed - Patient is age 18 or older        Passed - No active pregnancy on record        Passed - No positive pregnancy test in last 12 months        Passed - Recent (6 mo) or future (30 days) visit within the authorizing provider's specialty     Patient had office visit in the last 6 months or has a visit in the next 30 days with authorizing provider or within the authorizing provider's specialty.  See \"Patient Info\" tab in inbasket, or \"Choose Columns\" in Meds & Orders section of the refill encounter.               Last Written Prescription Date:  12/26/19  Last Fill Quantity: 90,  # refills: 0   Last office visit: 11/19/2019 with prescribing provider:     Future Office Visit:   Next 5 appointments (look out 90 days)    Mar 24, 2020  7:00 AM CDT  Return Visit with Elliot Moreno, [facility] ([address]  [place] [address]  [phone]   Apr 07, 2020  7:00 AM CDT  Return Visit with Elliot Moreno, [facility] ([facility]) [address] [place] [address]  [place] [address]  [phone]   Apr 24, 2020  7:00 AM CDT  Return Visit with Elliot Moreno, [facility] ([address]  [place] [address]  [phone]           "

## 2020-03-24 ENCOUNTER — VIRTUAL VISIT (OUTPATIENT)
Dept: PSYCHOLOGY | Facility: CLINIC | Age: 25
End: 2020-03-24
Payer: COMMERCIAL

## 2020-03-24 DIAGNOSIS — F41.1 GAD (GENERALIZED ANXIETY DISORDER): Primary | ICD-10-CM

## 2020-03-24 DIAGNOSIS — F34.1 PERSISTENT DEPRESSIVE DISORDER: ICD-10-CM

## 2020-03-24 PROCEDURE — 90832 PSYTX W PT 30 MINUTES: CPT | Mod: TEL | Performed by: MARRIAGE & FAMILY THERAPIST

## 2020-03-24 NOTE — PROGRESS NOTES
"  Telephone Visit Note    Patient Name: Tracy Jeffrey  Date: 3/24/20         Service Type: Telephone Visit     The patient has been notified of following:     \"This telephone visit will be conducted via a call between you and your provider. We have found that certain health care needs can be provided without the need for an office visit.  This service lets us provide the care you need with a short phone conversation.    If during the course of the call the provider feels a telephone visit is not appropriate, you will not be charged for this service.\"      Session Start Time: 7:00  Session End Time: 7:25     Session Length: 16-37    Session #: 13    Attendees: Client attended alone     DATA      Progress Since Last Session (Related to Symptoms / Goals / Homework):   Symptoms: Worsening increased anxiety      Episode of Care Goals: Minimal progress - ACTION (Actively working towards change); Intervened by reinforcing change plan / affirming steps taken     Current / Ongoing Stressors and Concerns:   Client reported increased anxiety related to coronavirus and its effects.  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     Intervention:   CBT: reviewed with client identifying and challenging her anxiety-producing thoughts about possibilities, focusing on current reality.  DBT: reviewed with client engaging in enjoyable activities for distraction.        ASSESSMENT: Current Emotional / Mental Status (status of significant symptoms):   Risk status (Self / Other harm or suicidal ideation)   Patient denies current fears or concerns for personal safety.   Patient denies current or recent suicidal ideation or behaviors.   Patient denies current or recent homicidal ideation or " behaviors.   Patient denies current or recent self injurious behavior or ideation.   Patient denies other safety concerns.   Patient reports there has been no change in risk factors since their last session.     Patient reports there has been no change in protective factors since their last session.     Recommended that patient call 911 or go to the local ED should there be a change in any of these risk factors.     Attitude:   Cooperative    Orientation:   All   Speech    Rate / Production: Normal     Volume:  Normal    Mood:    Anxious  Normal   Thought Content:  Clear  Perservative    Thought Form:  Coherent  Logical  Obsessive    Insight:    Good      Medication Compliance:   Yes     Changes in Health Issues:   None reported     Chemical Use Review:   Substance Use: Chemical use reviewed, no active concerns identified      Tobacco Use: No current tobacco use.      Diagnosis:  1. NIKOLAY (generalized anxiety disorder)    2. Persistent depressive disorder          PLAN: (Patient Tasks / Therapist Tasks / Other)  Patient agreed to continue to identify and challenge her anxiety-producing thoughts about possibilities, focusing on current reality, and engaging in enjoyable activities for distraction.      I have reviewed the note as documented above.  This accurately captures the substance of my conversation with the patient.  Elliot Moreno LMFT

## 2020-04-07 ENCOUNTER — VIRTUAL VISIT (OUTPATIENT)
Dept: PSYCHOLOGY | Facility: CLINIC | Age: 25
End: 2020-04-07
Payer: COMMERCIAL

## 2020-04-07 DIAGNOSIS — F41.1 GAD (GENERALIZED ANXIETY DISORDER): Primary | ICD-10-CM

## 2020-04-07 DIAGNOSIS — F34.1 PERSISTENT DEPRESSIVE DISORDER: ICD-10-CM

## 2020-04-07 PROCEDURE — 90834 PSYTX W PT 45 MINUTES: CPT | Mod: TEL | Performed by: MARRIAGE & FAMILY THERAPIST

## 2020-04-07 NOTE — PROGRESS NOTES
"                                           Progress Note    Patient Name: Tracy Jeffrey  Date: 4/7/20         Service Type: Individual  Video Visit: No     Session Start Time: 7:05  Session End Time: 7:57     Session Length: 38-52    Session #: 14    Attendees: Client attended alone     Treatment Plan Last Reviewed: 4/7/20, next due 7/7/20.  PHQ-9 / NIKOLAY-7 : not completed.    DATA  Interactive Complexity: No  Crisis: No       Progress Since Last Session (Related to Symptoms / Goals / Homework):   Symptoms: Client reported experiencing continued anxiety and guilt.    Homework: Partially completed       Episode of Care Goals: Minimal progress - ACTION (Actively working towards change); Intervened by reinforcing change plan / affirming steps taken     Current / Ongoing Stressors and Concerns:   Client reported continuing to experience high anxiety regarding coronavirus and its effects.  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  Client reported that she has a second job interview tomorrow.     Treatment Objective(s) Addressed in This Session:   use cognitive strategies identified in therapy to challenge anxious thoughts  Identify negative self-talk and behaviors: challenge core beliefs, myths, and actions     Intervention:   CBT: reviewed with client embracing notion of \"good enough,\" focusing on what she can do/control and letting go of things outsided of her control.  DBT: taught/reviewed with client concepts of justified vs. unjustified guilt and responses for each.  Motivational Interviewing: used circular/Socratic questioning to elicit client's identificaiton of her desired continued therapy goals.        ASSESSMENT: Current Emotional / Mental Status (status of significant symptoms):   Risk status (Self / Other harm or suicidal ideation)   Patient denies current fears or concerns for personal safety.   Patient denies current or recent " "suicidal ideation or behaviors.   Patientdenies current or recent homicidal ideation or behaviors.   Patient denies current or recent self injurious behavior or ideation.   Patient denies other safety concerns.   Patient Patient reports there has been no change in risk factors since their last session.     PatientPatient reports there has been no change in protective factors since their last session.     Recommended that patient call 911 or go to the local ED should there be a change in any of these risk factors.     Appearance:   unable to assess (phone visit)    Eye Contact:   unable to assess (phone visit)    Psychomotor Behavior: unable to assess (phone visit)    Attitude:   Cooperative    Orientation:   All   Speech    Rate / Production: Normal     Volume:  Normal    Mood:    Anxious  Normal   Affect:    unable to assess (phone visit)    Thought Content:  Clear    Thought Form:  Coherent  Logical    Insight:    Good  and Intellectual Insight     Medication Review:   No changes to current psychiatric medication(s)     Medication Compliance:   Yes     Changes in Health Issues:   None reported     Chemical Use Review:   Substance Use: Chemical use reviewed, no active concerns identified      Tobacco Use: No current tobacco use.      Diagnosis:  1. NIKOLAY (generalized anxiety disorder)    2. Persistent depressive disorder        Collateral Reports Completed:   Not Applicable    PLAN: (Patient Tasks / Therapist Tasks / Other)  Client agreed to work on embracing notion of \"good enough,\"  focusing on what she can do/control and letting go of things outside of her control, and challenging her unjustified guilt.        JEAN-PIERRE Mcgarry                                                         ______________________________________________________________________    Treatment Plan    Patient's Name: Tracy Jeffrey  YOB: 1995    Date: 4/7/20    DSM5 Diagnoses: 300.4 (F34.1) Persistent Depressive " Disorder, Early onset or 300.02 (F41.1) Generalized Anxiety Disorder  Psychosocial / Contextual Factors: problems with primary support group: family conflict, trauma history  WHODAS: 25    Referral / Collaboration:  Referral to another professional/service is not indicated at this time..    Anticipated number of session or this episode of care: 21-30      MeasurableTreatment Goal(s) related to diagnosis / functional impairment(s)  Goal 1: Client will successfully process through past trauma defined as reporting 0 Subjective Units of Distress related to trauma on 0-10 scale and 7 on Validity of (positive) Cognition scale about self on 1-7 scale   I will know I've met my goal when I am less impacted by my past trauma.       Objective #A (Client Action)    Status: Conitnued - Date: 4/7/20      Client will identify past traumatic events/memories which are causing current distress.     Intervention(s)  Therapist will take client's history and facilitate client's identification of targets for EMDR.     Objective #B  Client will complete needed assessment(s) and [facility] EM[facility] resourcing to confirm readiness for EMDR.    Status: Continued - Date: 4/7/20      Intervention(s)  Therapist will administer Dissociative Experiences Scale, Multidimensional Inventory of Dissociation as needed and complete [facility] EMDR resourcing with client.     Objective #C  Client will engage in installing at least 2 EMDR resources.  Status: Continued - Date: 4/7/20      Intervention(s)  Therapist will complete EMDR resourcing (Container, Remote Control, grounding/progressive muscle relaxation, Light Stream, Inner Advisor) with client.     Objective #D (Client Action)    Status: Continued - Date: 4/7/20      Client will engage in reprocessing all past traumatic event/memory targets.     Intervention(s)   Therapist will complete EMDR reprocessing with client.    Goal 2: Client will increase overall baseline calm mindset by 2 points on a 1-10  Likert scale per self-report (10 = optimal calm mindset).    I will know I've met my goal when I feel less anxious.      Objective #A (Client Action)    Status: Continued - Date: 4/7/20     Client will use cognitive strategies identified in therapy to challenge anxious thoughts.    Intervention(s)  Therapist will teach emotional regulation skills. CBT/REBT ABCD model.    Objective #B  Client will use at least 2 new coping skills for anxiety management in the next 12 weeks.    Status: Continued - Date: 4/7/20     Intervention(s)  Therapist will teach emotional regulation skills. DBT Core Mindfulness, Distress Tolerance, Emotion Regulation, and Interpersonal Effectiveness skills.    Goal 3: Client will improve overall baseline mood by 2 points on a 1-10 Likert scale per self-report (10 = optimal mood).    I will know I've met my goal when I feel better/less depressed overall.      Objective #A (Client Action)    Status: Continued - Date:  4/7/20    Client will Identify negative self-talk and behaviors: challenge core beliefs, myths, and actions.    Intervention(s)  Therapist will teach emotional regulation skills. CBT/REBT ABCD model.    Objective #B  Client will Increase interest, engagement, and pleasure in doing things  Decrease frequency and intensity of feeling down, depressed, hopeless  Improve concentration, focus, and mindfulness in daily activities .    Status: Continued - Date:  4/7/20    Intervention(s)  Therapist will teach emotional regulation skills. DBT Core Mindfulness, Distress Tolerance, Emotion Regulation, and Interpersonal Effetiveness skills.    Goal 4:  Client will improve her interpersonal functioning in relationships by 2 points on a 1-10 Likert scale per self-report (10 = optimal interpersonal functioning).    I will know I've met my goal when my relationships have improved.      Objective #A (Client Action)    Status: Continued- Date: 4/7/20     Client will learn & utilize at least 2  assertive communication skills weekly.    Intervention(s)  Therapist will teach assertiveness skills. Nonviolent Communication and DBT Interpersonal Effectiveness skills..    Objective #B  Client will practice setting boundaries 2 times in the next 12 weeks.    Status: Continued - Date: 4/7/20     Intervention(s)  Therapist will teach assertiveness skills. [place]-setting..    Patient has reviewed and agreed to the above plan.      Elliot Moreno, LMFT  April 7, 2020

## 2020-04-24 ENCOUNTER — VIRTUAL VISIT (OUTPATIENT)
Dept: PSYCHOLOGY | Facility: CLINIC | Age: 25
End: 2020-04-24
Payer: COMMERCIAL

## 2020-04-24 DIAGNOSIS — F34.1 PERSISTENT DEPRESSIVE DISORDER: ICD-10-CM

## 2020-04-24 DIAGNOSIS — F41.1 GAD (GENERALIZED ANXIETY DISORDER): Primary | ICD-10-CM

## 2020-04-24 PROCEDURE — 90832 PSYTX W PT 30 MINUTES: CPT | Mod: GT | Performed by: MARRIAGE & FAMILY THERAPIST

## 2020-04-24 NOTE — PROGRESS NOTES
Progress Note    Patient Name: Tracy Jeffrey  Date: 4/7/20         Service Type: Individual      Session Start Time: 7:00  Session End Time: 7:30     Session Length: 30    Session #: 15    Attendees: Client attended alone     Telemedicine Visit: The patient's condition can be safely assessed and treated via synchronous audio and visual telemedicine encounter.      Reason for Telemedicine Visit: Services only offered telehealth    Originating Site (Patient Location): Patient's home    Distant Site (Provider Location): Provider Remote Setting Clinician's home    Consent:  The patient/guardian has verbally consented to: the potential risks and benefits of telemedicine (video visit) versus in person care; bill my insurance or make self-payment for services provided; and responsibility for payment of non-covered services.     Mode of Communication:  Video Conference via [employer]    As the provider I attest to compliance with applicable laws and regulations related to telemedicine.    Treatment Plan Last Reviewed: 4/7/20, next due 7/7/20.  PHQ-9 / NIKOLAY-7 : not completed.    DATA  Interactive Complexity: No  Crisis: No       Progress Since Last Session (Related to Symptoms / Goals / Homework):   Symptoms: Client reported experiencing continued anxiety and procrastination.    Homework: Partially completed       Episode of Care Goals: Minimal progress - ACTION (Actively working towards change); Intervened by reinforcing change plan / affirming steps taken     Current / Ongoing Stressors and Concerns:   Client reported continuing to experience high anxiety regarding coronavirus and its effects.  Client reported struggling with feeling overwhelmed by her workload which has increased significantly.  Client reported that she often gets paralyzed by focusing on what needs to be done and doesn't end up accomplishing it.     Treatment Objective(s) Addressed in This Session:   use  cognitive strategies identified in therapy to challenge anxious thoughts  Identify negative self-talk and behaviors: challenge core beliefs, myths, and actions     Intervention:   CBT: reviewed with client reframing the workload to focusing on doing one thing at a time, and using taught/reviewed time management techniques.        ASSESSMENT: Current Emotional / Mental Status (status of significant symptoms):   Risk status (Self / Other harm or suicidal ideation)   Patient denies current fears or concerns for personal safety.   Patient denies current or recent suicidal ideation or behaviors.   Patientdenies current or recent homicidal ideation or behaviors.   Patient denies current or recent self injurious behavior or ideation.   Patient denies other safety concerns.   Patient Patient reports there has been no change in risk factors since their last session.     PatientPatient reports there has been no change in protective factors since their last session.     Recommended that patient call 911 or go to the local ED should there be a change in any of these risk factors.     Appearance:   Appropriate    Eye Contact:   Fair    Psychomotor Behavior: Normal    Attitude:   Cooperative    Orientation:   All   Speech    Rate / Production: Normal     Volume:  Normal    Mood:    Anxious  Normal   Affect:    Appropriate  Worrisome    Thought Content:  Clear    Thought Form:  Coherent  Logical    Insight:    Good  and Intellectual Insight     Medication Review:   No changes to current psychiatric medication(s)     Medication Compliance:   Yes     Changes in Health Issues:   None reported     Chemical Use Review:   Substance Use: Chemical use reviewed, no active concerns identified      Tobacco Use: No current tobacco use.      Diagnosis:  1. NIKOLAY (generalized anxiety disorder)    2. Persistent depressive disorder        Collateral Reports Completed:   Not Applicable    PLAN: (Patient Tasks / Therapist Tasks / Other)  Client  agreed to work on reframing her perspective on her workload to focus on doing one thing at time, utilizing taught/reviewed time management strategies.        Elliot Banda Tiffanie, LMFT                                                         ______________________________________________________________________    Treatment Plan    Patient's Name: Tracy Jeffrey  YOB: 1995    Date: 4/7/20    DSM5 Diagnoses: 300.4 (F34.1) Persistent Depressive Disorder, Early onset or 300.02 (F41.1) Generalized Anxiety Disorder  Psychosocial / Contextual Factors: problems with primary support group: family conflict, trauma history  WHODAS: 25    Referral / Collaboration:  Referral to another professional/service is not indicated at this time..    Anticipated number of session or this episode of care: 21-30      MeasurableTreatment Goal(s) related to diagnosis / functional impairment(s)  Goal 1: Client will successfully process through past trauma defined as reporting 0 Subjective Units of Distress related to trauma on 0-10 scale and 7 on Validity of (positive) Cognition scale about self on 1-7 scale   I will know I've met my goal when I am less impacted by my past trauma.       Objective #A (Client Action)    Status: Conitnued - Date: 4/7/20      Client will identify past traumatic events/memories which are causing current distress.     Intervention(s)  Therapist will take client's history and facilitate client's identification of targets for EMDR.     Objective #B  Client will complete needed assessment(s) and [facility] resourcing to confirm readiness for EMDR.    Status: Continued - Date: 4/7/20      Intervention(s)  Therapist will administer Dissociative Experiences Scale, Multidimensional Inventory of Dissociation as needed and complete [facility] resourcing with client.     Objective #C  Client will engage in installing at least 2 EMDR resources.  Status: Continued - Date: 4/7/20       Intervention(s)  Therapist will complete EMDR resourcing (Container, Remote Control, grounding/progressive muscle relaxation, Light Stream, Inner Advisor) with client.     Objective #D (Client Action)    Status: Continued - Date: 4/7/20      Client will engage in reprocessing all past traumatic event/memory targets.     Intervention(s)   Therapist will complete EMDR reprocessing with client.    Goal 2: Client will increase overall baseline calm mindset by 2 points on a 1-10 Likert scale per self-report (10 = optimal calm mindset).    I will know I've met my goal when I feel less anxious.      Objective #A (Client Action)    Status: Continued - Date: 4/7/20     Client will use cognitive strategies identified in therapy to challenge anxious thoughts.    Intervention(s)  Therapist will teach emotional regulation skills. CBT/REBT ABCD model.    Objective #B  Client will use at least 2 new coping skills for anxiety management in the next 12 weeks.    Status: Continued - Date: 4/7/20     Intervention(s)  Therapist will teach emotional regulation skills. DBT Core Mindfulness, Distress Tolerance, Emotion Regulation, and Interpersonal Effectiveness skills.    Goal 3: Client will improve overall baseline mood by 2 points on a 1-10 Likert scale per self-report (10 = optimal mood).    I will know I've met my goal when I feel better/less depressed overall.      Objective #A (Client Action)    Status: Continued - Date:  4/7/20    Client will Identify negative self-talk and behaviors: challenge core beliefs, myths, and actions.    Intervention(s)  Therapist will teach emotional regulation skills. CBT/REBT ABCD model.    Objective #B  Client will Increase interest, engagement, and pleasure in doing things  Decrease frequency and intensity of feeling down, depressed, hopeless  Improve concentration, focus, and mindfulness in daily activities .    Status: Continued - Date:  4/7/20    Intervention(s)  Therapist will teach  emotional regulation skills. DBT Core Mindfulness, Distress Tolerance, Emotion Regulation, and Interpersonal Effetiveness skills.    Goal 4:  Client will improve her interpersonal functioning in relationships by 2 points on a 1-10 Likert scale per self-report (10 = optimal interpersonal functioning).    I will know I've met my goal when my relationships have improved.      Objective #A (Client Action)    Status: Continued- Date: 4/7/20     Client will learn & utilize at least 2 assertive communication skills weekly.    Intervention(s)  Therapist will teach assertiveness skills. Nonviolent Communication and DBT Interpersonal Effectiveness skills..    Objective #B  Client will practice setting boundaries 2 times in the next 12 weeks.    Status: Continued - Date: 4/7/20     Intervention(s)  Therapist will teach assertiveness skills. [place]-setting..    Patient has reviewed and agreed to the above plan.      Elliot Moreno, LMFT  April 24, 2020

## 2020-05-05 ENCOUNTER — VIRTUAL VISIT (OUTPATIENT)
Dept: PSYCHOLOGY | Facility: CLINIC | Age: 25
End: 2020-05-05
Payer: COMMERCIAL

## 2020-05-05 DIAGNOSIS — F34.1 PERSISTENT DEPRESSIVE DISORDER: ICD-10-CM

## 2020-05-05 DIAGNOSIS — F41.1 GAD (GENERALIZED ANXIETY DISORDER): Primary | ICD-10-CM

## 2020-05-05 PROCEDURE — 90834 PSYTX W PT 45 MINUTES: CPT | Mod: GT | Performed by: MARRIAGE & FAMILY THERAPIST

## 2020-05-05 NOTE — PROGRESS NOTES
Progress Note    Patient Name: Tracy Jeffrey  Date: 5/5/20         Service Type: Individual      Session Start Time: 7:00  Session End Time: 7:52     Session Length: 52    Session #: 16    Attendees: Client attended alone     Telemedicine Visit: The patient's condition can be safely assessed and treated via synchronous audio and visual telemedicine encounter.      Reason for Telemedicine Visit: Services only offered telehealth    Originating Site (Patient Location): Patient's home    Distant Site (Provider Location): Provider Remote Setting Clinician's home    Consent:  The patient/guardian has verbally consented to: the potential risks and benefits of telemedicine (video visit) versus in person care; bill my insurance or make self-payment for services provided; and responsibility for payment of non-covered services.     Mode of Communication:  Video Conference via [employer]    As the provider I attest to compliance with applicable laws and regulations related to telemedicine.    Treatment Plan Last Reviewed: 4/7/20, next due 7/7/20.  PHQ-9 / NIKOLAY-7 : not completed.    DATA  Interactive Complexity: No  Crisis: No       Progress Since Last Session (Related to Symptoms / Goals / Homework):   Symptoms: Client reported experiencing continued anxiety and depression.    Homework: Partially completed       Episode of Care Goals: Minimal progress - ACTION (Actively working towards change); Intervened by reinforcing change plan / affirming steps taken     Current / Ongoing Stressors and Concerns:   Client reported continuing to experience high anxiety and depression regarding coronavirus and its effects.  Client reported struggling with feeling overwhelmed by her workload related to her upcoming job change.  Client reported that she often emotionally eats, counter to her desire to eat/be healthier.     Treatment Objective(s) Addressed in This Session:   use at least some coping  skills for anxiety management in the next few weeks  Improve diet, appetite, mindful eating, and / or meal planning  Identify negative self-talk and behaviors: challenge core beliefs, myths, and actions     Intervention:   CBT: reviewed with client focusing on what she can do/control and letting go of things outside of her control.  DBT: taught/reviewed with client skills/strategies for distraction and choosing alternative behaviors and rewards for emotions.        ASSESSMENT: Current Emotional / Mental Status (status of significant symptoms):   Risk status (Self / Other harm or suicidal ideation)   Patient denies current fears or concerns for personal safety.   Patient denies current or recent suicidal ideation or behaviors.   Patientdenies current or recent homicidal ideation or behaviors.   Patient denies current or recent self injurious behavior or ideation.   Patient denies other safety concerns.   Patient Patient reports there has been no change in risk factors since their last session.     PatientPatient reports there has been no change in protective factors since their last session.     Recommended that patient call 911 or go to the local ED should there be a change in any of these risk factors.     Appearance:   Appropriate    Eye Contact:   Fair    Psychomotor Behavior: Normal    Attitude:   Cooperative    Orientation:   All   Speech    Rate / Production: Normal     Volume:  Normal    Mood:    Anxious  Normal   Affect:    Appropriate  Worrisome    Thought Content:  Clear    Thought Form:  Coherent  Logical    Insight:    Good  and Intellectual Insight     Medication Review:   No changes to current psychiatric medication(s)     Medication Compliance:   Yes     Changes in Health Issues:   None reported     Chemical Use Review:   Substance Use: Chemical use reviewed, no active concerns identified      Tobacco Use: No current tobacco use.      Diagnosis:  1. NIKOLAY (generalized anxiety disorder)    2. Persistent  depressive disorder        Collateral Reports Completed:   Not Applicable    PLAN: (Patient Tasks / Therapist Tasks / Other)  Client agreed to work on focusing on what she can do/control while letting go of things outside of her control, and using taught skills/strategies for effectively managing emotional eating tendencies.        Elloit Solo Moreno, LMFT 5/5/2020                                                         ______________________________________________________________________    Treatment Plan    Patient's Name: Tracy Jeffrey  YOB: 1995    Date: 4/7/20    DSM5 Diagnoses: 300.4 (F34.1) Persistent Depressive Disorder, Early onset or 300.02 (F41.1) Generalized Anxiety Disorder  Psychosocial / Contextual Factors: problems with primary support group: family conflict, trauma history  WHODAS: 25    Referral / Collaboration:  Referral to another professional/service is not indicated at this time..    Anticipated number of session or this episode of care: 21-30      MeasurableTreatment Goal(s) related to diagnosis / functional impairment(s)  Goal 1: Client will successfully process through past trauma defined as reporting 0 Subjective Units of Distress related to trauma on 0-10 scale and 7 on Validity of (positive) Cognition scale about self on 1-7 scale   I will know I've met my goal when I am less impacted by my past trauma.       Objective #A (Client Action)    Status: Conitnued - Date: 4/7/20      Client will identify past traumatic events/memories which are causing current distress.     Intervention(s)  Therapist will take client's history and facilitate client's identification of targets for EMDR.     Objective #B  Client will complete needed assessment(s) and [facility] resourcing to confirm readiness for EMDR.    Status: Continued - Date: 4/7/20      Intervention(s)  Therapist will administer Dissociative Experiences Scale, Multidimensional Inventory of Dissociation as needed  and complete [facility] resourcing with client.     Objective #C  Client will engage in installing at least 2 EMDR resources.  Status: Continued - Date: 4/7/20      Intervention(s)  Therapist will complete EMDR resourcing (Container, Remote Control, grounding/progressive muscle relaxation, Light Stream, Inner Advisor) with client.     Objective #D (Client Action)    Status: Continued - Date: 4/7/20      Client will engage in reprocessing all past traumatic event/memory targets.     Intervention(s)   Therapist will complete EMDR reprocessing with client.    Goal 2: Client will increase overall baseline calm mindset by 2 points on a 1-10 Likert scale per self-report (10 = optimal calm mindset).    I will know I've met my goal when I feel less anxious.      Objective #A (Client Action)    Status: Continued - Date: 4/7/20     Client will use cognitive strategies identified in therapy to challenge anxious thoughts.    Intervention(s)  Therapist will teach emotional regulation skills. CBT/REBT ABCD model.    Objective #B  Client will use at least 2 new coping skills for anxiety management in the next 12 weeks.    Status: Continued - Date: 4/7/20     Intervention(s)  Therapist will teach emotional regulation skills. DBT Core Mindfulness, Distress Tolerance, Emotion Regulation, and Interpersonal Effectiveness skills.    Goal 3: Client will improve overall baseline mood by 2 points on a 1-10 Likert scale per self-report (10 = optimal mood).    I will know I've met my goal when I feel better/less depressed overall.      Objective #A (Client Action)    Status: Continued - Date:  4/7/20    Client will Identify negative self-talk and behaviors: challenge core beliefs, myths, and actions.    Intervention(s)  Therapist will teach emotional regulation skills. CBT/REBT ABCD model.    Objective #B  Client will Increase interest, engagement, and pleasure in doing things  Decrease frequency and intensity of feeling down,  depressed, hopeless  Improve concentration, focus, and mindfulness in daily activities .    Status: Continued - Date:  4/7/20    Intervention(s)  Therapist will teach emotional regulation skills. DBT Core Mindfulness, Distress Tolerance, Emotion Regulation, and Interpersonal Effetiveness skills.    Goal 4:  Client will improve her interpersonal functioning in relationships by 2 points on a 1-10 Likert scale per self-report (10 = optimal interpersonal functioning).    I will know I've met my goal when my relationships have improved.      Objective #A (Client Action)    Status: Continued- Date: 4/7/20     Client will learn & utilize at least 2 assertive communication skills weekly.    Intervention(s)  Therapist will teach assertiveness skills. Nonviolent Communication and DBT Interpersonal Effectiveness skills..    Objective #B  Client will practice setting boundaries 2 times in the next 12 weeks.    Status: Continued - Date: 4/7/20     Intervention(s)  Therapist will teach assertiveness skills. [place]-setting..    Patient has reviewed and agreed to the above plan.      Elliot Moreno, LMFT  April 7, 2020

## 2020-05-20 ENCOUNTER — VIRTUAL VISIT (OUTPATIENT)
Dept: PSYCHOLOGY | Facility: CLINIC | Age: 25
End: 2020-05-20
Payer: COMMERCIAL

## 2020-05-20 DIAGNOSIS — F34.1 PERSISTENT DEPRESSIVE DISORDER: ICD-10-CM

## 2020-05-20 DIAGNOSIS — F41.1 GAD (GENERALIZED ANXIETY DISORDER): Primary | ICD-10-CM

## 2020-05-20 PROCEDURE — 90834 PSYTX W PT 45 MINUTES: CPT | Mod: TEL | Performed by: MARRIAGE & FAMILY THERAPIST

## 2020-05-20 NOTE — PROGRESS NOTES
"                                           Progress Note    Patient Name: Tracy Jeffrey  Date: 5/20/20         Service Type: Individual      Session Start Time: 7:10  Session End Time: 7:55     Session Length: 45    Session #: 17    Attendees: Client attended alone     The patient has been notified of the following:      \"We have found that certain health care needs can be provided without the need for a face to face visit.  This service lets us provide the care you need with a phone conversation.       I will have full access to your [place] [place] medical record during this entire phone call.   I will be taking notes for your medical record.      Since this is like an office visit, we will bill your insurance company for this service.       There are potential benefits and risks of telephone visits (e.g. limits to patient confidentiality) that differ from in-person visits.?  Confidentiality still applies for telephone services, and nobody will record the visit.  It is important to be in a quiet, private space that is free of distractions (including cell phone or other devices) during the visit.??      If during the course of the call I believe a telephone visit is not appropriate, you will not be charged for this service\"     Consent has been obtained for this service by care team member: Yes  Video visit failed (patient reported not receiving video visit invitations)--switched to phone visit.    Treatment Plan Last Reviewed: 4/7/20, next due 7/7/20.  PHQ-9 / NIKOLAY-7 : not completed.    DATA  Interactive Complexity: No  Crisis: No       Progress Since Last Session (Related to Symptoms / Goals / Homework):   Symptoms: Worsening Client reported experiencing increased anxiety.    Homework: Partially completed       Episode of Care Goals: Minimal progress - ACTION (Actively working towards change); Intervened by reinforcing change plan / affirming steps taken     Current / Ongoing Stressors and Concerns:   Client reported " experiencing increased anxiety over the past few days.  Client reported continuing to feel overwhelmed by her workload related to her upcoming job change.  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     Treatment Objective(s) Addressed in This Session:   use cognitive strategies identified in therapy to challenge anxious thoughts     Intervention:   CBT: re-reviewed with client focusing on what she can do/control and letting go of things outside of her control.  DBT: reviewed with client skills/strategies for distraction and staying mindfully in the present moment        ASSESSMENT: Current Emotional / Mental Status (status of significant symptoms):   Risk status (Self / Other harm or suicidal ideation)   Patient denies current fears or concerns for personal safety.   Patient denies current or recent suicidal ideation or behaviors.   Patientdenies current or recent homicidal ideation or behaviors.   Patient denies current or recent self injurious behavior or ideation.   Patient denies other safety concerns.   Patient Patient reports there has been no change in risk factors since their last session.     PatientPatient reports there has been no change in protective factors since their last session.     Recommended that patient call 911 or go to the local ED should there be a change in any of these risk factors.     Appearance:   unable to assess (phone visit)    Eye Contact:   unable to assess (phone visit)    Psychomotor Behavior: unable to assess (phone visit)    Attitude:   Cooperative    Orientation:   All   Speech    Rate / Production: Normal     Volume:  Normal    Mood:    Anxious  Normal   Affect:    unable to assess (phone visit)    Thought Content:  Clear    Thought Form:  Coherent  Logical    Insight:    Good  and Intellectual Insight     Medication Review:   No changes to current psychiatric medication(s)     Medication  Compliance:   Yes     Changes in Health Issues:   None reported     Chemical Use Review:   Substance Use: Chemical use reviewed, no active concerns identified      Tobacco Use: No current tobacco use.      Diagnosis:  1. NIKOLAY (generalized anxiety disorder)    2. Persistent depressive disorder        Collateral Reports Completed:   Not Applicable    PLAN: (Patient Tasks / Therapist Tasks / Other)  Client agreed to continue to work on focusing on what she can do/control while letting go of things outside of her control, and using taught skills/strategies for distraction and staying mindfully in the present moment.        Elliot Moreno, [facility] 5/20/2020                                                         ______________________________________________________________________    Treatment Plan    Patient's Name: Tracy Jeffrey  YOB: 1995    Date: 4/7/20    DSM5 Diagnoses: 300.4 (F34.1) Persistent Depressive Disorder, Early onset or 300.02 (F41.1) Generalized Anxiety Disorder  Psychosocial / Contextual Factors: problems with primary support group: family conflict, trauma history  WHODAS: 25    Referral / Collaboration:  Referral to another professional/service is not indicated at this time..    Anticipated number of session or this episode of care: 21-30      MeasurableTreatment Goal(s) related to diagnosis / functional impairment(s)  Goal 1: Client will successfully process through past trauma defined as reporting 0 Subjective Units of Distress related to trauma on 0-10 scale and 7 on Validity of (positive) Cognition scale about self on 1-7 scale   I will know I've met my goal when I am less impacted by my past trauma.       Objective #A (Client Action)    Status: Conitnued - Date: 4/7/20      Client will identify past traumatic events/memories which are causing current distress.     Intervention(s)  Therapist will take client's history and facilitate client's identification of targets for  EMDR.     Objective #B  Client will complete needed assessment(s) and [facility] resourcing to confirm readiness for EMDR.    Status: Continued - Date: 4/7/20      Intervention(s)  Therapist will administer Dissociative Experiences Scale, Multidimensional Inventory of Dissociation as needed and complete [facility] resourcing with client.     Objective #C  Client will engage in installing at least 2 EMDR resources.  Status: Continued - Date: 4/7/20      Intervention(s)  Therapist will complete EMDR resourcing (Container, Remote Control, grounding/progressive muscle relaxation, Light Stream, Inner Advisor) with client.     Objective #D (Client Action)    Status: Continued - Date: 4/7/20      Client will engage in reprocessing all past traumatic event/memory targets.     Intervention(s)   Therapist will complete EMDR reprocessing with client.    Goal 2: Client will increase overall baseline calm mindset by 2 points on a 1-10 Likert scale per self-report (10 = optimal calm mindset).    I will know I've met my goal when I feel less anxious.      Objective #A (Client Action)    Status: Continued - Date: 4/7/20     Client will use cognitive strategies identified in therapy to challenge anxious thoughts.    Intervention(s)  Therapist will teach emotional regulation skills. CBT/REBT ABCD model.    Objective #B  Client will use at least 2 new coping skills for anxiety management in the next 12 weeks.    Status: Continued - Date: 4/7/20     Intervention(s)  Therapist will teach emotional regulation skills. DBT Core Mindfulness, Distress Tolerance, Emotion Regulation, and Interpersonal Effectiveness skills.    Goal 3: Client will improve overall baseline mood by 2 points on a 1-10 Likert scale per self-report (10 = optimal mood).    I will know I've met my goal when I feel better/less depressed overall.      Objective #A (Client Action)    Status: Continued - Date:  4/7/20    Client will Identify negative self-talk  and behaviors: challenge core beliefs, myths, and actions.    Intervention(s)  Therapist will teach emotional regulation skills. CBT/REBT ABCD model.    Objective #B  Client will Increase interest, engagement, and pleasure in doing things  Decrease frequency and intensity of feeling down, depressed, hopeless  Improve concentration, focus, and mindfulness in daily activities .    Status: Continued - Date:  4/7/20    Intervention(s)  Therapist will teach emotional regulation skills. DBT Core Mindfulness, Distress Tolerance, Emotion Regulation, and Interpersonal Effetiveness skills.    Goal 4:  Client will improve her interpersonal functioning in relationships by 2 points on a 1-10 Likert scale per self-report (10 = optimal interpersonal functioning).    I will know I've met my goal when my relationships have improved.      Objective #A (Client Action)    Status: Continued- Date: 4/7/20     Client will learn & utilize at least 2 assertive communication skills weekly.    Intervention(s)  Therapist will teach assertiveness skills. Nonviolent Communication and DBT Interpersonal Effectiveness skills..    Objective #B  Client will practice setting boundaries 2 times in the next 12 weeks.    Status: Continued - Date: 4/7/20     Intervention(s)  Therapist will teach assertiveness skills. [place]-setting..    Patient has reviewed and agreed to the above plan.      Elliot Moreno, LMFT  April 7, 2020

## 2020-06-09 ENCOUNTER — VIRTUAL VISIT (OUTPATIENT)
Dept: PSYCHOLOGY | Facility: CLINIC | Age: 25
End: 2020-06-09
Payer: COMMERCIAL

## 2020-06-09 DIAGNOSIS — F41.1 GAD (GENERALIZED ANXIETY DISORDER): ICD-10-CM

## 2020-06-09 DIAGNOSIS — F34.1 PERSISTENT DEPRESSIVE DISORDER: Primary | ICD-10-CM

## 2020-06-09 PROCEDURE — 90834 PSYTX W PT 45 MINUTES: CPT | Mod: GT | Performed by: MARRIAGE & FAMILY THERAPIST

## 2020-06-09 NOTE — PROGRESS NOTES
Progress Note    Patient Name: Tracy Jeffrey  Date: 6/9/20         Service Type: Individual      Session Start Time: 7:06  Session End Time: 7:52     Session Length: 46    Session #: 18    Attendees: Client attended alone     Telemedicine Visit: The patient's condition can be safely assessed and treated via synchronous audio and visual telemedicine encounter.      Reason for Telemedicine Visit: Services only offered telehealth    Originating Site (Patient Location): Patient's home    Distant Site (Provider Location): Provider Remote Setting    Consent:  The patient/guardian has verbally consented to: the potential risks and benefits of telemedicine (video visit) versus in person care; bill my insurance or make self-payment for services provided; and responsibility for payment of non-covered services.     Mode of Communication:  Video Conference via Glass    As the provider I attest to compliance with applicable laws and regulations related to telemedicine.    Treatment Plan Last Reviewed: 4/7/20, next due 7/7/20.  PHQ-9 / NIKOLAY-7 : not completed.    DATA  Interactive Complexity: No  Crisis: No       Progress Since Last Session (Related to Symptoms / Goals / Homework):   Symptoms: Client reported experiencing continued depression and anxiety.    Homework: Partially completed       Episode of Care Goals: Minimal progress - ACTION (Actively working towards change); Intervened by reinforcing change plan / affirming steps taken     Current / Ongoing Stressors and Concerns:   Client reported experiencing high depression and anxiety over the past two weeks corresponding with her transition of completing her job.  Client reported having been overwhelmed by her workload of needing to catch up on a lot of paperwork.  Client reported that she is struggling with a lack of recognition for her efforts on her job and internalizing that negativity.     Treatment Objective(s)  Addressed in This Session:   use cognitive strategies identified in therapy to challenge anxious thoughts  Identify negative self-talk and behaviors: challenge core beliefs, myths, and actions     Intervention:   CBT: reviewed with client challenging her internalization of others' negativity.        ASSESSMENT: Current Emotional / Mental Status (status of significant symptoms):   Risk status (Self / Other harm or suicidal ideation)   Patient denies current fears or concerns for personal safety.   Patient denies current or recent suicidal ideation or behaviors.   Patientdenies current or recent homicidal ideation or behaviors.   Patient denies current or recent self injurious behavior or ideation.   Patient denies other safety concerns.   Patient Patient reports there has been no change in risk factors since their last session.     PatientPatient reports there has been no change in protective factors since their last session.     Recommended that patient call 911 or go to the local ED should there be a change in any of these risk factors.     Appearance:   Appropriate    Eye Contact:   Good    Psychomotor Behavior: Normal    Attitude:   Cooperative  Interested Friendly Pleasant Attentive   Orientation:   All   Speech    Rate / Production: Normal     Volume:  Normal    Mood:    Anxious  Normal   Affect:    Appropriate  Expansive  Worrisome    Thought Content:  Clear  Perservative  Rumination    Thought Form:  Coherent  Logical    Insight:    Fair  and Intellectual Insight     Medication Review:   No changes to current psychiatric medication(s)     Medication Compliance:   Yes     Changes in Health Issues:   None reported     Chemical Use Review:   Substance Use: Chemical use reviewed, no active concerns identified      Tobacco Use: No current tobacco use.      Diagnosis:  1. Persistent depressive disorder    2. NIKOLAY (generalized anxiety disorder)        Collateral Reports Completed:   Not Applicable    PLAN: (Patient  Tasks / Therapist Tasks / Other)  Client agreed to work on challenging her internalization of others' negativity.        Elliot Moreno, LMFT 6/9/2020                                                         ______________________________________________________________________    Treatment Plan    Patient's Name: Tracy Jeffrey  YOB: 1995    Date: 4/7/20    DSM5 Diagnoses: 300.4 (F34.1) Persistent Depressive Disorder, Early onset or 300.02 (F41.1) Generalized Anxiety Disorder  Psychosocial / Contextual Factors: problems with primary support group: family conflict, trauma history  WHODAS: 25    Referral / Collaboration:  Referral to another professional/service is not indicated at this time..    Anticipated number of session or this episode of care: 21-30      MeasurableTreatment Goal(s) related to diagnosis / functional impairment(s)  Goal 1: Client will successfully process through past trauma defined as reporting 0 Subjective Units of Distress related to trauma on 0-10 scale and 7 on Validity of (positive) Cognition scale about self on 1-7 scale   I will know I've met my goal when I am less impacted by my past trauma.       Objective #A (Client Action)    Status: Conitnued - Date: 4/7/20      Client will identify past traumatic events/memories which are causing current distress.     Intervention(s)  Therapist will take client's history and facilitate client's identification of targets for EMDR.     Objective #B  Client will complete needed assessment(s) and [facility] resourcing to confirm readiness for EMDR.    Status: Continued - Date: 4/7/20      Intervention(s)  Therapist will administer Dissociative Experiences Scale, Multidimensional Inventory of Dissociation as needed and complete [facility] resourcing with client.     Objective #C  Client will engage in installing at least 2 EMDR resources.  Status: Continued - Date: 4/7/20      Intervention(s)  Therapist will complete  EMDR resourcing (Container, Remote Control, grounding/progressive muscle relaxation, Light Stream, Inner Advisor) with client.     Objective #D (Client Action)    Status: Continued - Date: 4/7/20      Client will engage in reprocessing all past traumatic event/memory targets.     Intervention(s)   Therapist will complete EMDR reprocessing with client.    Goal 2: Client will increase overall baseline calm mindset by 2 points on a 1-10 Likert scale per self-report (10 = optimal calm mindset).    I will know I've met my goal when I feel less anxious.      Objective #A (Client Action)    Status: Continued - Date: 4/7/20     Client will use cognitive strategies identified in therapy to challenge anxious thoughts.    Intervention(s)  Therapist will teach emotional regulation skills. CBT/REBT ABCD model.    Objective #B  Client will use at least 2 new coping skills for anxiety management in the next 12 weeks.    Status: Continued - Date: 4/7/20     Intervention(s)  Therapist will teach emotional regulation skills. DBT Core Mindfulness, Distress Tolerance, Emotion Regulation, and Interpersonal Effectiveness skills.    Goal 3: Client will improve overall baseline mood by 2 points on a 1-10 Likert scale per self-report (10 = optimal mood).    I will know I've met my goal when I feel better/less depressed overall.      Objective #A (Client Action)    Status: Continued - Date:  4/7/20    Client will Identify negative self-talk and behaviors: challenge core beliefs, myths, and actions.    Intervention(s)  Therapist will teach emotional regulation skills. CBT/REBT ABCD model.    Objective #B  Client will Increase interest, engagement, and pleasure in doing things  Decrease frequency and intensity of feeling down, depressed, hopeless  Improve concentration, focus, and mindfulness in daily activities .    Status: Continued - Date:  4/7/20    Intervention(s)  Therapist will teach emotional regulation skills. DBT Core Mindfulness,  Distress Tolerance, Emotion Regulation, and Interpersonal Effetiveness skills.    Goal 4:  Client will improve her interpersonal functioning in relationships by 2 points on a 1-10 Likert scale per self-report (10 = optimal interpersonal functioning).    I will know I've met my goal when my relationships have improved.      Objective #A (Client Action)    Status: Continued- Date: 4/7/20     Client will learn & utilize at least 2 assertive communication skills weekly.    Intervention(s)  Therapist will teach assertiveness skills. Nonviolent Communication and DBT Interpersonal Effectiveness skills..    Objective #B  Client will practice setting boundaries 2 times in the next 12 weeks.    Status: Continued - Date: 4/7/20     Intervention(s)  Therapist will teach assertiveness skills. [place]-setting..    Patient has reviewed and agreed to the above plan.      Elliot Moreno, LMFT  April 7, 2020

## 2020-06-23 ENCOUNTER — VIRTUAL VISIT (OUTPATIENT)
Dept: PSYCHOLOGY | Facility: CLINIC | Age: 25
End: 2020-06-23
Payer: COMMERCIAL

## 2020-06-23 DIAGNOSIS — F41.1 GAD (GENERALIZED ANXIETY DISORDER): Primary | ICD-10-CM

## 2020-06-23 DIAGNOSIS — F34.1 PERSISTENT DEPRESSIVE DISORDER: ICD-10-CM

## 2020-06-23 PROCEDURE — 90834 PSYTX W PT 45 MINUTES: CPT | Mod: TEL | Performed by: MARRIAGE & FAMILY THERAPIST

## 2020-06-23 NOTE — PROGRESS NOTES
"                                           Progress Note    Patient Name: Tracy Jeffrey  Date: 6/23/20         Service Type: Individual      Session Start Time: 7:10  Session End Time: 7:55     Session Length: 45    Session #: 19    Attendees: Client attended alone     The patient has been notified of the following:      \"We have found that certain health care needs can be provided without the need for a face to face visit.  This service lets us provide the care you need with a phone conversation.       I will have full access to your [facility] medical record during this entire phone call.   I will be taking notes for your medical record.      Since this is like an office visit, we will bill your insurance company for this service.       There are potential benefits and risks of telephone visits (e.g. limits to patient confidentiality) that differ from in-person visits.?  Confidentiality still applies for telephone services, and nobody will record the visit.  It is important to be in a quiet, private space that is free of distractions (including cell phone or other devices) during the visit.??      If during the course of the call I believe a telephone visit is not appropriate, you will not be charged for this service\"     Consent has been obtained for this service by care team member: Yes  Patient missed video visit invitation, elected phone visit for this session.    Treatment Plan Last Reviewed: 4/7/20, next due 7/7/20.  PHQ-9 / NIKOLAY-7 : not completed.    DATA  Interactive Complexity: No  Crisis: No       Progress Since Last Session (Related to Symptoms / Goals / Homework):   Symptoms: Client reported experiencing continued depression and anxiety.    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  Client reported that many of the employees have negative attitudes after having been bought out, and client reported struggling with working in negative environment.  Client reported that she is struggling with not feeling effective as a result.     Treatment Objective(s) Addressed in This Session:   use cognitive strategies identified in therapy to challenge anxious thoughts  Identify negative self-talk and behaviors: challenge core beliefs, myths, and actions     Intervention:   CBT: re-reviewed with client challenging her internalization of others' negativity and resultant negative self-talk.  Also reviewed with client letting go of others' outcomes.        ASSESSMENT: Current Emotional / Mental Status (status of significant symptoms):   Risk status (Self / Other harm or suicidal ideation)   Patient denies current fears or concerns for personal safety.   Patient denies current or recent suicidal ideation or behaviors.   Patientdenies current or recent homicidal ideation or behaviors.   Patient denies current or recent self injurious behavior or ideation.   Patient denies other safety concerns.   Patient Patient reports there has been no change in risk factors since their last session.     PatientPatient reports there has been no change in protective factors since their last session.     Recommended that patient call 911 or go to the local ED should there be a change in any of these risk factors.     Appearance:   Appropriate    Eye Contact:   Good    Psychomotor Behavior: Normal    Attitude:   Cooperative  Interested Friendly Pleasant Attentive   Orientation:   All   Speech    Rate / Production: Normal     Volume:  Normal    Mood:    Anxious  Normal   Affect:    Appropriate  Expansive  Worrisome    Thought Content:  Clear  Perservative  Rumination    Thought Form:  Coherent  Logical    Insight:    Fair  and Intellectual Insight     Medication  Review:   No changes to current psychiatric medication(s)     Medication Compliance:   Yes     Changes in Health Issues:   None reported     Chemical Use Review:   Substance Use: Chemical use reviewed, no active concerns identified      Tobacco Use: No current tobacco use.      Diagnosis:  1. NIKOLAY (generalized anxiety disorder)    2. Persistent depressive disorder        Collateral Reports Completed:   Not Applicable    PLAN: (Patient Tasks / Therapist Tasks / Other)  Client agreed to continue to work on challenging her internalization of others' negativity and resultant negative self-talk, and letting go of others' outcomes.        Elliot Ewingbarry Moreno, [facility] 6/23/2020                                                         ______________________________________________________________________    Treatment Plan    Patient's Name: Tracy Jeffrey  YOB: 1995    Date: 4/7/20    DSM5 Diagnoses: 300.4 (F34.1) Persistent Depressive Disorder, Early onset or 300.02 (F41.1) Generalized Anxiety Disorder  Psychosocial / Contextual Factors: problems with primary support group: family conflict, trauma history  WHODAS: 25    Referral / Collaboration:  Referral to another professional/service is not indicated at this time..    Anticipated number of session or this episode of care: 21-30      MeasurableTreatment Goal(s) related to diagnosis / functional impairment(s)  Goal 1: Client will successfully process through past trauma defined as reporting 0 Subjective Units of Distress related to trauma on 0-10 scale and 7 on Validity of (positive) Cognition scale about self on 1-7 scale   I will know I've met my goal when I am less impacted by my past trauma.       Objective #A (Client Action)    Status: Conitnued - Date: 4/7/20      Client will identify past traumatic events/memories which are causing current distress.     Intervention(s)  Therapist will take client's history and facilitate client's identification of  targets for EMDR.     Objective #B  Client will complete needed assessment(s) and [facility] EM[facility] resourcing to confirm readiness for EMDR.    Status: Continued - Date: 4/7/20      Intervention(s)  Therapist will administer Dissociative Experiences Scale, Multidimensional Inventory of Dissociation as needed and complete [facility] EMDR resourcing with client.     Objective #C  Client will engage in installing at least 2 EMDR resources.  Status: Continued - Date: 4/7/20      Intervention(s)  Therapist will complete EMDR resourcing (Container, Remote Control, grounding/progressive muscle relaxation, Light Stream, Inner Advisor) with client.     Objective #D (Client Action)    Status: Continued - Date: 4/7/20      Client will engage in reprocessing all past traumatic event/memory targets.     Intervention(s)   Therapist will complete EMDR reprocessing with client.    Goal 2: Client will increase overall baseline calm mindset by 2 points on a 1-10 Likert scale per self-report (10 = optimal calm mindset).    I will know I've met my goal when I feel less anxious.      Objective #A (Client Action)    Status: Continued - Date: 4/7/20     Client will use cognitive strategies identified in therapy to challenge anxious thoughts.    Intervention(s)  Therapist will teach emotional regulation skills. CBT/REBT ABCD model.    Objective #B  Client will use at least 2 new coping skills for anxiety management in the next 12 weeks.    Status: Continued - Date: 4/7/20     Intervention(s)  Therapist will teach emotional regulation skills. DBT Core Mindfulness, Distress Tolerance, Emotion Regulation, and Interpersonal Effectiveness skills.    Goal 3: Client will improve overall baseline mood by 2 points on a 1-10 Likert scale per self-report (10 = optimal mood).    I will know I've met my goal when I feel better/less depressed overall.      Objective #A (Client Action)    Status: Continued - Date:  4/7/20    Client will Identify negative  self-talk and behaviors: challenge core beliefs, myths, and actions.    Intervention(s)  Therapist will teach emotional regulation skills. CBT/REBT ABCD model.    Objective #B  Client will Increase interest, engagement, and pleasure in doing things  Decrease frequency and intensity of feeling down, depressed, hopeless  Improve concentration, focus, and mindfulness in daily activities .    Status: Continued - Date:  4/7/20    Intervention(s)  Therapist will teach emotional regulation skills. DBT Core Mindfulness, Distress Tolerance, Emotion Regulation, and Interpersonal Effetiveness skills.    Goal 4:  Client will improve her interpersonal functioning in relationships by 2 points on a 1-10 Likert scale per self-report (10 = optimal interpersonal functioning).    I will know I've met my goal when my relationships have improved.      Objective #A (Client Action)    Status: Continued- Date: 4/7/20     Client will learn & utilize at least 2 assertive communication skills weekly.    Intervention(s)  Therapist will teach assertiveness skills. Nonviolent Communication and DBT Interpersonal Effectiveness skills..    Objective #B  Client will practice setting boundaries 2 times in the next 12 weeks.    Status: Continued - Date: 4/7/20     Intervention(s)  Therapist will teach assertiveness skills. [place]-setting..    Patient has reviewed and agreed to the above plan.      Elliot Moreno, LMFT  April 7, 2020

## 2020-07-05 DIAGNOSIS — F41.1 GAD (GENERALIZED ANXIETY DISORDER): ICD-10-CM

## 2020-07-05 DIAGNOSIS — F33.1 MODERATE EPISODE OF RECURRENT MAJOR DEPRESSIVE DISORDER (H): ICD-10-CM

## 2020-07-07 NOTE — TELEPHONE ENCOUNTER
Routing refill request to provider for review/approval because:  Due for 6 month provider visit and phq9 update.  Last saw PCP 9/24/19.    Miguelina Pal RN

## 2020-07-07 NOTE — TELEPHONE ENCOUNTER
"Requested Prescriptions   Pending Prescriptions Disp Refills     buPROPion (WELLBUTRIN XL) 150 MG 24 hr tablet [Pharmacy Med Name: BUPROPION HCL  MG TABLET] 90 tablet 0     Sig: TAKE 1 TABLET BY MOUTH EVERY DAY IN THE MORNING       SSRIs Protocol Failed - 7/5/2020  9:38 AM  NIKOLAY-7 SCORE 1/10/2020 2/12/2020 2/26/2020   Total Score 6 (mild anxiety) 6 (mild anxiety) 7 (mild anxiety)   Total Score 6 6 7             Failed - PHQ-9 score less than 5 in past 6 months     Please review last PHQ-9 score.   PHQ 2/12/2020 2/26/2020 3/10/2020   PHQ-9 Total Score 8 8 8   Q9: Thoughts of better off dead/self-harm past 2 weeks Not at all Not at all Not at all             Failed - Recent (6 mo) or future (30 days) visit within the authorizing provider's specialty     Patient had office visit in the last 6 months or has a visit in the next 30 days with authorizing provider or within the authorizing provider's specialty.  See \"Patient Info\" tab in inbasket, or \"Choose Columns\" in Meds & Orders section of the refill encounter.            Passed - Medication is Bupropion     If the medication is Bupropion (Wellbutrin), and the patient is taking for smoking cessation; OK to refill.          Passed - Medication is active on med list        Passed - Patient is age 18 or older        Passed - No active pregnancy on record        Passed - No positive pregnancy test in last 12 months           Last Written Prescription Date:  3/20/2020  Last Fill Quantity: 90,  # refills: 0   Last office visit: 9/24/2019 with prescribing provider:  David  Future Office Visit:            "

## 2020-07-08 RX ORDER — BUPROPION HYDROCHLORIDE 150 MG/1
TABLET ORAL
Qty: 90 TABLET | Refills: 0 | Status: SHIPPED | OUTPATIENT
Start: 2020-07-08 | End: 2020-07-29

## 2020-07-08 NOTE — TELEPHONE ENCOUNTER
90 day refill sent, can schedule virtual visit in September for re-evaluation.    Keshav Hernandez MD    Filled per FM protocol.     MELIA BenitezN, RN  [facility] [facility]

## 2020-07-17 ENCOUNTER — VIRTUAL VISIT (OUTPATIENT)
Dept: PSYCHOLOGY | Facility: CLINIC | Age: 25
End: 2020-07-17
Payer: COMMERCIAL

## 2020-07-17 DIAGNOSIS — F34.1 PERSISTENT DEPRESSIVE DISORDER: Primary | ICD-10-CM

## 2020-07-17 DIAGNOSIS — F41.1 GAD (GENERALIZED ANXIETY DISORDER): ICD-10-CM

## 2020-07-17 PROCEDURE — 90834 PSYTX W PT 45 MINUTES: CPT | Mod: GT | Performed by: MARRIAGE & FAMILY THERAPIST

## 2020-07-17 NOTE — PROGRESS NOTES
Progress Note    Patient Name: Tracy Jeffrey  Date: 7/17/20         Service Type: Individual      Session Start Time: 7:03  Session End Time: 7:55     Session Length: 52    Session #: 20    Attendees: Client attended alone     Telemedicine Visit: The patient's condition can be safely assessed and treated via synchronous audio and visual telemedicine encounter.      Reason for Telemedicine Visit: Services only offered telehealth    Originating Site (Patient Location): Patient's home    Distant Site (Provider Location): Provider Remote Setting    Consent:  The patient/guardian has verbally consented to: the potential risks and benefits of telemedicine (video visit) versus in person care; bill my insurance or make self-payment for services provided; and responsibility for payment of non-covered services.     Mode of Communication:  Video Conference via [employer]    As the provider I attest to compliance with applicable laws and regulations related to telemedicine.    Treatment Plan Last Reviewed: 7/17/20, next due 10/17/20.  PHQ-9 / NIKOLAY-7 : not completed.    DATA  Interactive Complexity: No  Crisis: No       Progress Since Last Session (Related to Symptoms / Goals / Homework):   Symptoms: Client reported experiencing continued depression and anxiety.    Homework: Partially completed       Episode of Care Goals: Minimal progress - ACTION (Actively working towards change); Intervened by reinforcing change plan / affirming steps taken     Current / Ongoing Stressors and Concerns:   Client reported experiencing depression related to past traumatic relationship.  Client reported that she struggles with wondering if she did anything wrong in the relationship and related self-doubt.  Client reported that she is struggling with whether or not her feelings are valid.     Treatment Objective(s) Addressed in This Session:   Identify negative self-talk and behaviors: challenge core  beliefs, myths, and actions     Intervention:   CBT: reviewed with client challenging her self-doubt.  DBT: reviewed with client practicing self-validation of her feelings.  Motivational Interviewing: used circular/Socratic questioning to elicit client's identification of her desired continued therapy goals.        ASSESSMENT: Current Emotional / Mental Status (status of significant symptoms):   Risk status (Self / Other harm or suicidal ideation)   Patient denies current fears or concerns for personal safety.   Patient denies current or recent suicidal ideation or behaviors.   Patientdenies current or recent homicidal ideation or behaviors.   Patient denies current or recent self injurious behavior or ideation.   Patient denies other safety concerns.   Patient Patient reports there has been no change in risk factors since their last session.     PatientPatient reports there has been no change in protective factors since their last session.     Recommended that patient call 911 or go to the local ED should there be a change in any of these risk factors.     Appearance:   Appropriate    Eye Contact:   Good    Psychomotor Behavior: Normal    Attitude:   Cooperative  Interested Friendly Pleasant Attentive   Orientation:   All   Speech    Rate / Production: Normal     Volume:  Normal    Mood:    Anxious  Depressed  Normal   Affect:    Appropriate  Expansive  Subdued  Worrisome    Thought Content:  Clear  Perservative  Rumination    Thought Form:  Coherent  Logical    Insight:    Fair  and Intellectual Insight     Medication Review:   No changes to current psychiatric medication(s)     Medication Compliance:   Yes     Changes in Health Issues:   None reported     Chemical Use Review:   Substance Use: Chemical use reviewed, no active concerns identified      Tobacco Use: No current tobacco use.      Diagnosis:  1. Persistent depressive disorder    2. NIKOLAY (generalized anxiety disorder)        Collateral Reports  Completed:   Not Applicable    PLAN: (Patient Tasks / Therapist Tasks / Other)  Client agreed to work on challenging her self-doubt and practicing self-validation regarding her feelings.        Elliot Moreno, LMFT 7/17/2020                                                         ______________________________________________________________________    Treatment Plan    Patient's Name: Tracy Jeffrey  YOB: 1995    Date: 7/17/20    DSM5 Diagnoses: 300.4 (F34.1) Persistent Depressive Disorder, Early onset or 300.02 (F41.1) Generalized Anxiety Disorder  Psychosocial / Contextual Factors: problems with primary support group: family conflict, trauma history  WHODAS: 25    Referral / Collaboration:  Referral to another professional/service is not indicated at this time..    Anticipated number of session or this episode of care: 21-30      MeasurableTreatment Goal(s) related to diagnosis / functional impairment(s)  Goal 1: Client will successfully process through past trauma defined as reporting 0 Subjective Units of Distress related to trauma on 0-10 scale and 7 on Validity of (positive) Cognition scale about self on 1-7 scale   I will know I've met my goal when I am less impacted by my past trauma.       Objective #A (Client Action)    Status: Conitnued - Date: 7/17/20      Client will identify past traumatic events/memories which are causing current distress.     Intervention(s)  Therapist will take client's history and facilitate client's identification of targets for EMDR.     Objective #B  Client will complete needed assessment(s) and [facility] EM[facility] resourcing to confirm readiness for EMDR.    Status: Continued - Date: 7/17/20      Intervention(s)  Therapist will administer Dissociative Experiences Scale, Multidimensional Inventory of Dissociation as needed and complete [facility] EMDR resourcing with client.     Objective #C  Client will engage in installing at least 2 EMDR  resources.  Status: Continued - Date: 7/17/20      Intervention(s)  Therapist will complete EMDR resourcing (Container, Remote Control, grounding/progressive muscle relaxation, Light Stream, Inner Advisor) with client.     Objective #D (Client Action)    Status: Continued - Date: 7/17/20      Client will engage in reprocessing all past traumatic event/memory targets.     Intervention(s)   Therapist will complete EMDR reprocessing with client.    Goal 2: Client will increase overall baseline calm mindset by 2 points on a 1-10 Likert scale per self-report (10 = optimal calm mindset).    I will know I've met my goal when I feel less anxious.      Objective #A (Client Action)    Status: Continued - Date: 7/17/20     Client will use cognitive strategies identified in therapy to challenge anxious thoughts.    Intervention(s)  Therapist will teach emotional regulation skills. CBT/REBT ABCD model.    Objective #B  Client will use at least 2 new coping skills for anxiety management in the next 12 weeks.    Status: Continued - Date: 7/17/20     Intervention(s)  Therapist will teach emotional regulation skills. DBT Core Mindfulness, Distress Tolerance, Emotion Regulation, and Interpersonal Effectiveness skills.    Goal 3: Client will improve overall baseline mood by 2 points on a 1-10 Likert scale per self-report (10 = optimal mood).    I will know I've met my goal when I feel better/less depressed overall.      Objective #A (Client Action)    Status: Continued - Date:  7/17/20    Client will Identify negative self-talk and behaviors: challenge core beliefs, myths, and actions.    Intervention(s)  Therapist will teach emotional regulation skills. CBT/REBT ABCD model.    Objective #B  Client will Increase interest, engagement, and pleasure in doing things  Decrease frequency and intensity of feeling down, depressed, hopeless  Improve concentration, focus, and mindfulness in daily activities .    Status: Continued - Date:   7/17/20    Intervention(s)  Therapist will teach emotional regulation skills. DBT Core Mindfulness, Distress Tolerance, Emotion Regulation, and Interpersonal Effetiveness skills.    Goal 4:  Client will improve her interpersonal functioning in relationships by 2 points on a 1-10 Likert scale per self-report (10 = optimal interpersonal functioning).    I will know I've met my goal when my relationships have improved.      Objective #A (Client Action)    Status: Continued- Date: 7/17/20     Client will learn & utilize at least 2 assertive communication skills weekly.    Intervention(s)  Therapist will teach assertiveness skills. Nonviolent Communication and DBT Interpersonal Effectiveness skills..    Objective #B  Client will practice setting boundaries 2 times in the next 12 weeks.    Status: Continued - Date: 7/17/20     Intervention(s)  Therapist will teach assertiveness skills. [place]-setting..    Patient has reviewed and agreed to the above plan.      JEAN-PIERRE Mcgarry  July 17, 2020

## 2020-07-29 ENCOUNTER — VIRTUAL VISIT (OUTPATIENT)
Dept: FAMILY MEDICINE | Facility: CLINIC | Age: 25
End: 2020-07-29
Payer: COMMERCIAL

## 2020-07-29 DIAGNOSIS — F33.1 MODERATE EPISODE OF RECURRENT MAJOR DEPRESSIVE DISORDER (H): ICD-10-CM

## 2020-07-29 DIAGNOSIS — F41.1 GAD (GENERALIZED ANXIETY DISORDER): ICD-10-CM

## 2020-07-29 PROCEDURE — 99213 OFFICE O/P EST LOW 20 MIN: CPT | Mod: 95 | Performed by: INTERNAL MEDICINE

## 2020-07-29 RX ORDER — FAMOTIDINE 20 MG
TABLET ORAL
COMMUNITY

## 2020-07-29 RX ORDER — BUPROPION HYDROCHLORIDE 300 MG/1
300 TABLET ORAL EVERY MORNING
Qty: 90 TABLET | Refills: 3 | Status: SHIPPED | OUTPATIENT
Start: 2020-07-29 | End: 2021-10-12

## 2020-07-29 ASSESSMENT — ANXIETY QUESTIONNAIRES
1. FEELING NERVOUS, ANXIOUS, OR ON EDGE: MORE THAN HALF THE DAYS
7. FEELING AFRAID AS IF SOMETHING AWFUL MIGHT HAPPEN: SEVERAL DAYS
3. WORRYING TOO MUCH ABOUT DIFFERENT THINGS: NEARLY EVERY DAY
IF YOU CHECKED OFF ANY PROBLEMS ON THIS QUESTIONNAIRE, HOW DIFFICULT HAVE THESE PROBLEMS MADE IT FOR YOU TO DO YOUR WORK, TAKE CARE OF THINGS AT HOME, OR GET ALONG WITH OTHER PEOPLE: VERY DIFFICULT
5. BEING SO RESTLESS THAT IT IS HARD TO SIT STILL: NOT AT ALL
2. NOT BEING ABLE TO STOP OR CONTROL WORRYING: SEVERAL DAYS
6. BECOMING EASILY ANNOYED OR IRRITABLE: NOT AT ALL
GAD7 TOTAL SCORE: 9

## 2020-07-29 ASSESSMENT — PATIENT HEALTH QUESTIONNAIRE - PHQ9
SUM OF ALL RESPONSES TO PHQ QUESTIONS 1-9: 10
5. POOR APPETITE OR OVEREATING: MORE THAN HALF THE DAYS

## 2020-07-29 NOTE — PROGRESS NOTES
"Tracy Jeffrey is a 24 year old female who is being evaluated via a billable video visit.      The patient has been notified of following:     \"This video visit will be conducted via a call between you and your physician/provider. We have found that certain health care needs can be provided without the need for an in-person physical exam.  This service lets us provide the care you need with a video conversation.  If a prescription is necessary we can send it directly to your pharmacy.  If lab work is needed we can place an order for that and you can then stop by our lab to have the test done at a later time.    Video visits are billed at different rates depending on your insurance coverage.  Please reach out to your insurance provider with any questions.    If during the course of the call the physician/provider feels a video visit is not appropriate, you will not be charged for this service.\"    Patient has given verbal consent for Video visit? Yes  How would you like to obtain your AVS? MyChart  If you are dropped from the video visit, the video invite should be resent to: Text to cell phone: [phone]  Will anyone else be joining your video visit? No      Subjective     Tracy Jeffrey is a 24 year old female who presents today via video visit for the following health issues:    HPI     Chief Complaint   Patient presents with     Depression       I saw Tracy last in September and we weaned off of sertraline due to side effects and lack of effect and started Wellbutrin instead.  Today, she reports that her mood is overall pretty good, though more issues with anxiety than depression.  Focus and sleep have been good recently.  Tolerating Wellbutrin fine.     Abnormal Mood Symptoms  Onset: 1 and half years    Description:   Depression: YES  Anxiety: YES    Accompanying Signs & Symptoms:  Still participating in activities that you used to enjoy: YES  Fatigue: YES  Irritability: no   Difficulty concentrating: " YES  Changes in appetite: no   Problems with sleep: no   Heart racing/beating fast : YES  Thoughts of hurting yourself or others: none    History:   Recent stress: YES  Prior depression hospitalization: None  Family history of depression: YES  Family history of anxiety: YES    Precipitating factors:   Alcohol/drug use: YES- occ alcohol    Alleviating factors:  Wellbutrin helping depression  She is seeing a therapist on a regular basis    Therapies Tried and outcome: Wellbutrin (Bupropion)     She wonders about vitamin supplements- has history of Vit D supplement and is also taking a women's multivitamin.  Doesn't feel like she has the best diet.        PHQ 2/26/2020 3/10/2020 7/29/2020   PHQ-9 Total Score 8 8 10   Q9: Thoughts of better off dead/self-harm past 2 weeks Not at all Not at all Not at all     NIKOLAY-7 SCORE 2/12/2020 2/26/2020 7/29/2020   Total Score 6 (mild anxiety) 7 (mild anxiety) -   Total Score 6 7 9             Video Start Time: 1:58pm      Patient Active Problem List   Diagnosis     Health Care Home     NIKOLAY (generalized anxiety disorder)     Moderate episode of recurrent major depressive disorder (H)     Past Surgical History:   Procedure Laterality Date     C KYLEENA IUD 19.5 MG  07/2018     HC TOOTH EXTRACTION W/FORCEP  2007/2011    2 occaions     PAP DIAGNOSTIC SMEAR  12/2013    GYN       Social History     Tobacco Use     Smoking status: Never Smoker     Smokeless tobacco: Never Used   Substance Use Topics     Alcohol use: Yes     Alcohol/week: 2.5 standard drinks     Types: 3 Standard drinks or equivalent per week     Comment: occ     Family History   Problem Relation Age of Onset     Migraines Mother      Cerebrovascular Disease Maternal Grandfather      Pulmonary Embolism Paternal Grandmother      GERD Sister      Autism Spectrum Disorder Sister 3     Anxiety Disorder Sister      Depression Sister      C.A.D. No family hx of      Diabetes No family hx of      Hypertension No family hx of           Current Outpatient Medications   Medication Sig Dispense Refill     buPROPion (WELLBUTRIN XL) 150 MG 24 hr tablet TAKE 1 TABLET BY MOUTH EVERY DAY IN THE MORNING 90 tablet 0     cetirizine (ZYRTEC) 10 MG tablet Take 10 mg by mouth daily       levonorgestrel (KYLEENA) 19.5 MG IUD 1 each by Intrauterine route once Placed June 2018       Multiple Vitamins-Minerals (MULTIVITAMIN ADULTS PO)        Vitamin D, Cholecalciferol, 25 MCG (1000 UT) CAPS        Allergies   Allergen Reactions     Cats      Seasonal Allergies        Reviewed and updated as needed this visit by Provider         Review of Systems   Constitutional, psych systems are negative, except as otherwise noted.      Objective             Physical Exam     GENERAL: Healthy, alert and no distress  EYES: Eyes grossly normal to inspection.  No discharge or erythema, or obvious scleral/conjunctival abnormalities.  RESP: No audible wheeze, cough, or visible cyanosis.  No visible retractions or increased work of breathing.    SKIN: Visible skin clear. No significant rash, abnormal pigmentation or lesions.  NEURO: Cranial nerves grossly intact.  Mentation and speech appropriate for age.  PSYCH: Mentation appears normal, affect normal/bright, judgement and insight intact, normal speech and appearance well-groomed.            Assessment & Plan     1. NIKOLAY (generalized anxiety disorder)  and  2. Moderate episode of recurrent major depressive disorder (H)    Symptoms improved on the Wellbutrin, but anxiety is not controlled.  Will increase the dose from 150mg to 300mg daily.  Continue to follow with therapist.  Follow-up in 2 months if not improving.     - buPROPion (WELLBUTRIN XL) 300 MG 24 hr tablet; Take 1 tablet (300 mg) by mouth every morning  Dispense: 90 tablet; Refill: 3         Return in about 2 months (around 9/29/2020) for follow-up if not improving.    Keshav Hernandez MD  [facility]      Video-Visit Details    Type of service:  Video  Visit    Video End Time:2:09pm    Originating Location (pt. Location): Home    Distant Location (provider location):  [facility]     Platform used for Video Visit: Doximity    No follow-ups on file.       Keshav Hernandez MD

## 2020-07-30 ASSESSMENT — ANXIETY QUESTIONNAIRES: GAD7 TOTAL SCORE: 9

## 2020-08-20 ENCOUNTER — VIRTUAL VISIT (OUTPATIENT)
Dept: PSYCHOLOGY | Facility: CLINIC | Age: 25
End: 2020-08-20
Payer: COMMERCIAL

## 2020-08-20 DIAGNOSIS — F41.1 GAD (GENERALIZED ANXIETY DISORDER): Primary | ICD-10-CM

## 2020-08-20 DIAGNOSIS — F34.1 PERSISTENT DEPRESSIVE DISORDER: ICD-10-CM

## 2020-08-20 PROCEDURE — 90834 PSYTX W PT 45 MINUTES: CPT | Mod: TEL | Performed by: MARRIAGE & FAMILY THERAPIST

## 2020-08-20 NOTE — PROGRESS NOTES
"                                           Progress Note    Patient Name: Tracy Jeffrey  Date: 8/20/20         Service Type: Individual      Session Start Time: 7:05  Session End Time: 7:57     Session Length: 52    Session #: 21    Attendees: Client attended alone     The patient has been notified of the following:      \"We have found that certain health care needs can be provided without the need for a face to face visit.  This service lets us provide the care you need with a phone conversation.       I will have full access to your [facility] medical record during this entire phone call.   I will be taking notes for your medical record.      Since this is like an office visit, we will bill your insurance company for this service.       There are potential benefits and risks of telephone visits (e.g. limits to patient confidentiality) that differ from in-person visits.?  Confidentiality still applies for telephone services, and nobody will record the visit.  It is important to be in a quiet, private space that is free of distractions (including cell phone or other devices) during the visit.??      If during the course of the call I believe a telephone visit is not appropriate, you will not be charged for this service\"     Consent has been obtained for this service by care team member: Yes  Client missed video visit invitation--switched to phone visit.    Treatment Plan Last Reviewed: 7/17/20, next due 10/17/20.  PHQ-9 / NIKOLAY-7 : not completed.    DATA  Interactive Complexity: No  Crisis: No       Progress Since Last Session (Related to Symptoms / Goals / Homework):   Symptoms: Client reported experiencing continued anxiety and depression.    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  Client reported that she struggles with wondering if the new job will meet her expectations and stresses of distance learning/working from home and related difficulties with focus.  Client reported that she worries about being able to maintain boundaries with expectations/balance.     Treatment Objective(s) Addressed in This Session:   use cognitive strategies identified in therapy to challenge anxious thoughts  compile a list of boundaries that they would like to set with others. new job     Intervention:   CBT: reviewed with client challenging her anxiety-producing \"What if...?\" questions, focusing on present moment and what is in her control.  Interpersonal Therapy: reviewed with client setting boundaries regarding requests made of her at her new job.        ASSESSMENT: Current Emotional / Mental Status (status of significant symptoms):   Risk status (Self / Other harm or suicidal ideation)   Patient denies current fears or concerns for personal safety.   Patient denies current or recent suicidal ideation or behaviors.   Patientdenies current or recent homicidal ideation or behaviors.   Patient denies current or recent self injurious behavior or ideation.   Patient denies other safety concerns.   Patient Patient reports there has been no change in risk factors since their last session.     PatientPatient reports there has been no change in protective factors since their last session.     Recommended that patient call 911 or go to the local ED should there be a change in any of these risk factors.     Appearance:   unable to assess (phone visit)    Eye Contact:   unable to assess (phone visit)    Psychomotor Behavior: unable to assess (phone visit)    Attitude:   Cooperative  Interested Friendly Pleasant Attentive   Orientation:   All   Speech    Rate / Production: Normal     Volume:  Normal    Mood:    Anxious  Depressed  Normal   Affect:    unable to " "assess (phone visit)    Thought Content:  Clear  Perservative  Rumination    Thought Form:  Coherent  Logical    Insight:    Good  and Intellectual Insight     Medication Review:   No changes to current psychiatric medication(s)     Medication Compliance:   Yes     Changes in Health Issues:   None reported     Chemical Use Review:   Substance Use: Chemical use reviewed, no active concerns identified      Tobacco Use: No current tobacco use.      Diagnosis:  1. NIKOLAY (generalized anxiety disorder)    2. Persistent depressive disorder        Collateral Reports Completed:   Not Applicable    PLAN: (Patient Tasks / Therapist Tasks / Other)  Client agreed to work on challenging her anxiety-producing \"What if...?\" questions, focusing on present moment and what she can control, and setting boundaries with requests made of her at her new job.        Elliot Solo Moreno, LMFT 8/20/2020                                                         ______________________________________________________________________    Treatment Plan    Patient's Name: Tracy Jeffrey  YOB: 1995    Date: 7/17/20    DSM5 Diagnoses: 300.4 (F34.1) Persistent Depressive Disorder, Early onset or 300.02 (F41.1) Generalized Anxiety Disorder  Psychosocial / Contextual Factors: problems with primary support group: family conflict, trauma history  WHODAS: 25    Referral / Collaboration:  Referral to another professional/service is not indicated at this time..    Anticipated number of session or this episode of care: 21-30      MeasurableTreatment Goal(s) related to diagnosis / functional impairment(s)  Goal 1: Client will successfully process through past trauma defined as reporting 0 Subjective Units of Distress related to trauma on 0-10 scale and 7 on Validity of (positive) Cognition scale about self on 1-7 scale   I will know I've met my goal when I am less impacted by my past trauma.       Objective #A (Client Action)    Status: " Conitnued - Date: 7/17/20      Client will identify past traumatic events/memories which are causing current distress.     Intervention(s)  Therapist will take client's history and facilitate client's identification of targets for EMDR.     Objective #B  Client will complete needed assessment(s) and [facility] EMDR resourcing to confirm readiness for EMDR.    Status: Continued - Date: 7/17/20      Intervention(s)  Therapist will administer Dissociative Experiences Scale, Multidimensional Inventory of Dissociation as needed and complete [facility] EMDR resourcing with client.     Objective #C  Client will engage in installing at least 2 EMDR resources.  Status: Continued - Date: 7/17/20      Intervention(s)  Therapist will complete EMDR resourcing (Container, Remote Control, grounding/progressive muscle relaxation, Light Stream, Inner Advisor) with client.     Objective #D (Client Action)    Status: Continued - Date: 7/17/20      Client will engage in reprocessing all past traumatic event/memory targets.     Intervention(s)   Therapist will complete EMDR reprocessing with client.    Goal 2: Client will increase overall baseline calm mindset by 2 points on a 1-10 Likert scale per self-report (10 = optimal calm mindset).    I will know I've met my goal when I feel less anxious.      Objective #A (Client Action)    Status: Continued - Date: 7/17/20     Client will use cognitive strategies identified in therapy to challenge anxious thoughts.    Intervention(s)  Therapist will teach emotional regulation skills. CBT/REBT ABCD model.    Objective #B  Client will use at least 2 new coping skills for anxiety management in the next 12 weeks.    Status: Continued - Date: 7/17/20     Intervention(s)  Therapist will teach emotional regulation skills. DBT Core Mindfulness, Distress Tolerance, Emotion Regulation, and Interpersonal Effectiveness skills.    Goal 3: Client will improve overall baseline mood by 2 points on a 1-10  Likert scale per self-report (10 = optimal mood).    I will know I've met my goal when I feel better/less depressed overall.      Objective #A (Client Action)    Status: Continued - Date:  7/17/20    Client will Identify negative self-talk and behaviors: challenge core beliefs, myths, and actions.    Intervention(s)  Therapist will teach emotional regulation skills. CBT/REBT ABCD model.    Objective #B  Client will Increase interest, engagement, and pleasure in doing things  Decrease frequency and intensity of feeling down, depressed, hopeless  Improve concentration, focus, and mindfulness in daily activities .    Status: Continued - Date:  7/17/20    Intervention(s)  Therapist will teach emotional regulation skills. DBT Core Mindfulness, Distress Tolerance, Emotion Regulation, and Interpersonal Effetiveness skills.    Goal 4:  Client will improve her interpersonal functioning in relationships by 2 points on a 1-10 Likert scale per self-report (10 = optimal interpersonal functioning).    I will know I've met my goal when my relationships have improved.      Objective #A (Client Action)    Status: Continued- Date: 7/17/20     Client will learn & utilize at least 2 assertive communication skills weekly.    Intervention(s)  Therapist will teach assertiveness skills. Nonviolent Communication and DBT Interpersonal Effectiveness skills..    Objective #B  Client will practice setting boundaries 2 times in the next 12 weeks.    Status: Continued - Date: 7/17/20     Intervention(s)  Therapist will teach assertiveness skills. Cuming-setting..    Patient has reviewed and agreed to the above plan.      Elliot Moreno, LMFT  July 17, 2020

## 2020-10-15 DIAGNOSIS — F41.1 GAD (GENERALIZED ANXIETY DISORDER): ICD-10-CM

## 2020-10-15 DIAGNOSIS — F33.1 MODERATE EPISODE OF RECURRENT MAJOR DEPRESSIVE DISORDER (H): ICD-10-CM

## 2020-10-15 RX ORDER — BUPROPION HYDROCHLORIDE 300 MG/1
300 TABLET ORAL EVERY MORNING
Qty: 90 TABLET | Refills: 3 | OUTPATIENT
Start: 2020-10-15

## 2020-10-15 NOTE — TELEPHONE ENCOUNTER
"Requested Prescriptions   Pending Prescriptions Disp Refills     buPROPion (WELLBUTRIN XL) 300 MG 24 hr tablet 90 tablet 3     Sig: Take 1 tablet (300 mg) by mouth every morning       SSRIs Protocol Failed - 10/15/2020  1:20 PM        Failed - PHQ-9 score less than 5 in past 6 months     Please review last PHQ-9 score.           Passed - Medication is Bupropion     If the medication is Bupropion (Wellbutrin), and the patient is taking for smoking cessation; OK to refill.          Passed - Medication is active on med list        Passed - Patient is age 18 or older        Passed - No active pregnancy on record        Passed - No positive pregnancy test in last 12 months        Passed - Recent (6 mo) or future (30 days) visit within the authorizing provider's specialty     Patient had office visit in the last 6 months or has a visit in the next 30 days with authorizing provider or within the authorizing provider's specialty.  See \"Patient Info\" tab in inbasket, or \"Choose Columns\" in Meds & Orders section of the refill encounter.                 "

## 2020-11-29 ENCOUNTER — HEALTH MAINTENANCE LETTER (OUTPATIENT)
Age: 25
End: 2020-11-29

## 2021-09-25 ENCOUNTER — HEALTH MAINTENANCE LETTER (OUTPATIENT)
Age: 26
End: 2021-09-25

## 2021-10-08 ENCOUNTER — TELEPHONE (OUTPATIENT)
Dept: FAMILY MEDICINE | Facility: CLINIC | Age: 26
End: 2021-10-08

## 2021-10-08 DIAGNOSIS — F41.1 GAD (GENERALIZED ANXIETY DISORDER): ICD-10-CM

## 2021-10-08 DIAGNOSIS — F33.1 MODERATE EPISODE OF RECURRENT MAJOR DEPRESSIVE DISORDER (H): ICD-10-CM

## 2021-10-08 NOTE — LETTER
October 12, 2021      Tracy Jeffrey  [address]  [facility] [id number]        Dear Tracy,     We received a refill request for your Bupropion (WELLBUTRIN XL) 300mg medication.  This medication has been refilled for 90 days with no refill as you are due for a follow up visit for further refills. Virtual visit such us telephone or video appointment is okay.  Please call [phone] to schedule an appointment.            Sincerely,        Keshav Hernandez MD

## 2021-10-12 RX ORDER — BUPROPION HYDROCHLORIDE 300 MG/1
TABLET ORAL
Qty: 90 TABLET | Refills: 0 | Status: SHIPPED | OUTPATIENT
Start: 2021-10-12

## 2021-10-12 NOTE — TELEPHONE ENCOUNTER
One refill sent, please advise patient to schedule a visit (virtual okay) for further follow-up.    Thanks,  Keshav Hernandez MD

## 2021-10-12 NOTE — TELEPHONE ENCOUNTER
LM for patient to calll us back. Letter sent to patient.    Darling Carvalho on 10/12/2021 at 2:00 PM

## 2021-10-12 NOTE — TELEPHONE ENCOUNTER
"Requested Prescriptions   Pending Prescriptions Disp Refills     buPROPion (WELLBUTRIN XL) 300 MG 24 hr tablet [Pharmacy Med Name: BUPROPION HCL  MG TABLET] 90 tablet 3     Sig: TAKE 1 TABLET BY MOUTH EVERY MORNING       SSRIs Protocol Failed - 10/8/2021 12:20 AM        Failed - PHQ-9 score less than 5 in past 6 months     Please review last PHQ-9 score.           Failed - Recent (6 mo) or future (30 days) visit within the authorizing provider's specialty     Patient had office visit in the last 6 months or has a visit in the next 30 days with authorizing provider or within the authorizing provider's specialty.  See \"Patient Info\" tab in inbasket, or \"Choose Columns\" in Meds & Orders section of the refill encounter.            Passed - Medication is Bupropion     If the medication is Bupropion (Wellbutrin), and the patient is taking for smoking cessation; OK to refill.          Passed - Medication is active on med list        Passed - Patient is age 18 or older        Passed - No active pregnancy on record        Passed - No positive pregnancy test in last 12 months             "

## 2022-01-15 ENCOUNTER — HEALTH MAINTENANCE LETTER (OUTPATIENT)
Age: 27
End: 2022-01-15

## 2022-02-21 DIAGNOSIS — F33.1 MODERATE EPISODE OF RECURRENT MAJOR DEPRESSIVE DISORDER (H): ICD-10-CM

## 2022-02-21 DIAGNOSIS — F41.1 GAD (GENERALIZED ANXIETY DISORDER): ICD-10-CM

## 2022-02-21 RX ORDER — BUPROPION HYDROCHLORIDE 300 MG/1
TABLET ORAL
Qty: 90 TABLET | Refills: 0 | OUTPATIENT
Start: 2022-02-21

## 2022-02-21 NOTE — TELEPHONE ENCOUNTER
"Requested Prescriptions   Pending Prescriptions Disp Refills     buPROPion (WELLBUTRIN XL) 300 MG 24 hr tablet [Pharmacy Med Name: BUPROPION HCL  MG TABLET] 90 tablet 0     Sig: TAKE 1 TABLET BY MOUTH EVERY MORNING       SSRIs Protocol Failed - 2/21/2022 12:47 PM        Failed - PHQ-9 score less than 5 in past 6 months     Please review last PHQ-9 score.           Failed - Recent (6 mo) or future (30 days) visit within the authorizing provider's specialty     Patient had office visit in the last 6 months or has a visit in the next 30 days with authorizing provider or within the authorizing provider's specialty.  See \"Patient Info\" tab in inbasket, or \"Choose Columns\" in Meds & Orders section of the refill encounter.            Passed - Medication is Bupropion     If the medication is Bupropion (Wellbutrin), and the patient is taking for smoking cessation; OK to refill.          Passed - Medication is active on med list        Passed - Patient is age 18 or older        Passed - No active pregnancy on record        Passed - No positive pregnancy test in last 12 months             "

## 2022-02-22 NOTE — TELEPHONE ENCOUNTER
I have not seen patient since July 2020, dhara refill sent last October.  Appointment needed for follow-up.     Thanks,  Keshav Hernandez MD

## 2022-12-26 ENCOUNTER — HEALTH MAINTENANCE LETTER (OUTPATIENT)
Age: 27
End: 2022-12-26

## 2023-04-16 ENCOUNTER — HEALTH MAINTENANCE LETTER (OUTPATIENT)
Age: 28
End: 2023-04-16

## 2024-06-23 ENCOUNTER — HEALTH MAINTENANCE LETTER (OUTPATIENT)
Age: 29
End: 2024-06-23

## 2024-10-24 ENCOUNTER — VIRTUAL VISIT (OUTPATIENT)
Dept: BEHAVIORAL HEALTH | Facility: CLINIC | Age: 29
End: 2024-10-24
Payer: COMMERCIAL

## 2024-10-24 DIAGNOSIS — F40.10 SOCIAL ANXIETY DISORDER: ICD-10-CM

## 2024-10-24 DIAGNOSIS — F33.1 MODERATE EPISODE OF RECURRENT MAJOR DEPRESSIVE DISORDER (H): Primary | ICD-10-CM

## 2024-10-24 DIAGNOSIS — F41.1 GAD (GENERALIZED ANXIETY DISORDER): ICD-10-CM
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
## 2024-10-31 ENCOUNTER — VIRTUAL VISIT (OUTPATIENT)
Dept: BEHAVIORAL HEALTH | Facility: CLINIC | Age: 29
End: 2024-10-31
Payer: COMMERCIAL

## 2024-10-31 DIAGNOSIS — F33.1 MODERATE EPISODE OF RECURRENT MAJOR DEPRESSIVE DISORDER (H): Primary | ICD-10-CM

## 2024-10-31 DIAGNOSIS — F41.1 GAD (GENERALIZED ANXIETY DISORDER): ICD-10-CM

## 2024-10-31 DIAGNOSIS — F40.10 SOCIAL ANXIETY DISORDER: ICD-10-CM

## 2024-11-01 NOTE — PROGRESS NOTES
"[facility]  Department of Psychiatry & Behavioral Sciences  [facility] [[email or web address]]ORI COMPARE BA Session 2        Date of Service: Oct 31, 2024  Time of interview with patient: Start Time: 4:00pm Stop Time: 5:00pm  Provider: Miguel Cuevas M.A.  Referred by GERMAIN STUDY: Tracy Jeffrey was randomized to the behavioral activation therapy arm in the context of clinical research  People present:   Provider: Miguel Cuevas M.A.  Patient: Tracy Jeffrey  Others: NA    Telehealth Video Visit AddOn:  Type of service: Telemedicine Psychotherapy for depression .  The patient's condition can be safely assessed and treated via synchronous audio and visual telemedicine encounter.    Mode of Communication:  Video Conference via [employer]  Reason for Telemedicine Visit: This patient visit was a Telehealth video visit as part of a telehealth research study.   Originating Site (Patient Location): Patient's home  Distant Site (Provider Location): Provider Remote Setting- Home Office  Consent:  The patient has verbally consented to: the potential risks and benefits of telemedicine versus in person care with special emphasis on confidentiality given family members in the home.  Attestation: As the provider I attest to compliance with applicable laws and regulations related to telemedicine.     Identifying Data:  Tracy Jeffrey is a 28 year old female who prefers the name of \"Tracy\" & pronouns she, her.     Encounter Diagnoses   Name Primary?    Moderate episode of recurrent major depressive disorder (H) Yes    NIKOLAY (generalized anxiety disorder)     Social anxiety disorder      Rule Out: ADHD    MDD is currently in partial remission    Session Summary and Treatment Strategies  1. Reviewed self-monitoring since last session (Form 1)  Client completed monitoring each day this past week.   2. Explored and problem-solved issues with monitoring and collaboratively agree on how to make long term monitoring " feasible  3. Completed Life Areas, Values + Activities (LAVA) Inventory (Form 2 and Appendix 1: Moving from life areas and values to activities for examples as needed)    Patient successfully completed Form 2, identifying salient values and activities across each domain.  4. Introduced Activity Selection and Ranking (Form 3) and assigned as between-session exercise   5. Assigned [one low difficulty activity] and demonstrated how to integrate monitoring and activity scheduling (Form 1)    Patient will begin a crafting project next week.    Assignments:   - Complete Weekly monitoring with activity planning until next session (Form 1/client's chosen method)   - Complete Activity Selection and Ranking (Form 3)     Patient's reaction to treatment strategies: cooperative, engaged, positive  Patient's progress toward treatment goals: on track, working steadily toward goals    Treatment Plan:   Complete between session activation assignments (above):  Continue with behavioral activation therapy with writer until completion of 8 sessions per study requirement.  Continue with other healthcare providers    MENTAL STATUS EXAM                                                                                       Alertness: alert  and oriented  Appearance: well groomed  Behavior/Demeanor: cooperative, pleasant, and calm, with good  eye contact   Speech: normal and regular rate and rhythm Intact. Normal volume, nasima. No prosody.  Language: good. Preferred language identified as English.  Psychomotor: normal or unremarkable  Mood: anxious  Affect: full range; was congruent to mood; was congruent to content  Thought Process/Associations: unremarkable  Thought Content:  Reports none;  Denies suicidal ideation and violent ideation  Perception:  Reports none;  Denies none; intact   Insight: excellent  Judgment: excellent  Cognition: (6) does  appear grossly intact; formal cognitive testing was not done  Gait and Station:  enoch Cuevas      I did not directly participate in this patient visit. I discussed this patient with the above trainee in individual supervision and agree with the note and plan as documented.    Supervisor: Ani Lora, PhD, LP

## 2024-11-07 ENCOUNTER — VIRTUAL VISIT (OUTPATIENT)
Dept: BEHAVIORAL HEALTH | Facility: CLINIC | Age: 29
End: 2024-11-07
Payer: COMMERCIAL

## 2024-11-07 DIAGNOSIS — F41.1 GAD (GENERALIZED ANXIETY DISORDER): ICD-10-CM

## 2024-11-07 DIAGNOSIS — F33.1 MODERATE EPISODE OF RECURRENT MAJOR DEPRESSIVE DISORDER (H): Primary | ICD-10-CM

## 2024-11-07 DIAGNOSIS — F40.10 SOCIAL ANXIETY DISORDER: ICD-10-CM

## 2024-11-07 NOTE — PROGRESS NOTES
"[facility]  Department of Psychiatry & Behavioral Sciences  [facility]        Date of Service: Nov 7, 2024  Time of interview with patient: Start Time: 4:00pm Stop Time: 4:45pm  Provider: Miguel Cuevas M.A.  Referred by GERMAIN STUDY: Tracy Jeffrey was randomized to the behavioral activation therapy arm in the context of clinical research  People present:   Provider: Miguel Cuevas M.A.  Patient: Tracy Jeffrey  Others: NA    Telehealth Video Visit AddOn:  Type of service: Telemedicine Office Visit for depression .  The patient's condition can be safely assessed and treated via synchronous audio and visual telemedicine encounter.    Mode of Communication:  Video Conference via [employer]  Reason for Telemedicine Visit: This patient visit was a Telehealth video visit as part of a telehealth research study.   Originating Site (Patient Location): Patient's other car  Distant Site (Provider Location): Provider Remote Setting- Home Office  Consent:  The patient has verbally consented to: the potential risks and benefits of telemedicine versus in person care with special emphasis on confidentiality given family members in the home.  Attestation: As the provider I attest to compliance with applicable laws and regulations related to telemedicine.       Identifying Data:  Tracy Jeffrey is a 28 year old female who prefers the name of \"Tracy\" & pronouns she, her.     Encounter Diagnoses   Name Primary?    Moderate episode of recurrent major depressive disorder (H) Yes    NIKOLAY (generalized anxiety disorder)     Social anxiety disorder        Session Summary and Treatment strategies  1. Reviewed weekly monitoring since previous session (Form 1)    Patient completed monitoring 5/7 days this past week, will use calendar reminders to help with remembering monitoring.  2. Reviewed completion of activity assigned during previous session (Form 1)    Patient completed " 1/1 scheduled activities this week.  3. Reviewed Activity Selection and Ranking assignment (Form 3)    Patient successfully completed form 3 prior to the session and reviewed w/ provider.  4. Collaboratively agreed on planned activities until next session (Form 1)    -Wash face before bed   -Read tarot cards    Session Assignments:   - Complete Weekly monitoring with activity planning until next session (Form 1)    See #4 above    Patient's reaction to treatment strategies: cooperative, positive.  Patient's progress toward treatment goals: on track, progressing steadily towards treatment goals    Treatment Plan:   Complete between session activation assignments (above):  Continue with behavioral activation therapy with writer until completion of 8 sessions per study requirement.  Continue with other healthcare providers    MENTAL STATUS EXAM                                                                                       Alertness: alert  and oriented  Appearance: well groomed  Behavior/Demeanor: cooperative, pleasant, and calm, with good  eye contact   Speech: normal and regular rate and rhythm Intact. Normal volume, nasima. No prosody.  Language: good. Preferred language identified as English.  Psychomotor: normal or unremarkable  Mood: anxious  Affect: full range; was congruent to mood; was congruent to content  Thought Process/Associations: unremarkable  Thought Content:  Reports none;  Denies suicidal ideation and violent ideation  Perception:  Reports none;  Denies none; intact   Insight: excellent  Judgment: excellent  Cognition: (6) does  appear grossly intact; formal cognitive testing was not done  Gait and Station: unremarkable     Miguel Cuevas I did not directly participate in this patient visit. I discussed this patient with the above trainee in individual supervision and agree with the note and plan as documented.    Supervisor: Ani Lora, PhD, LP

## 2024-11-21 ENCOUNTER — VIRTUAL VISIT (OUTPATIENT)
Dept: BEHAVIORAL HEALTH | Facility: CLINIC | Age: 29
End: 2024-11-21
Payer: COMMERCIAL

## 2024-11-21 DIAGNOSIS — F41.1 GAD (GENERALIZED ANXIETY DISORDER): ICD-10-CM

## 2024-11-21 DIAGNOSIS — F33.1 MODERATE EPISODE OF RECURRENT MAJOR DEPRESSIVE DISORDER (H): Primary | ICD-10-CM

## 2024-11-22 NOTE — PROGRESS NOTES
"[facility]  Department of Psychiatry & Behavioral Sciences  [facility]        Date of Service: Nov 21, 2024  Time of interview with patient: Start Time: 4:00pm Stop Time: 4:45pm  Provider: Miguel Cuevas M.A.  Referred by GERMAIN STUDY: Tracy Jeffrey was randomized to the behavioral activation therapy arm in the context of clinical research  People present:   Provider: Miguel Cuevas M.A.  Patient: Tracy Jeffrey  Others: NA    Telehealth Video Visit AddOn:  Type of service: Telemedicine Office Visit for depression .  The patient's condition can be safely assessed and treated via synchronous audio and visual telemedicine encounter.    Mode of Communication:  Video Conference via [employer]  Reason for Telemedicine Visit: This patient visit was a Telehealth video visit as part of a telehealth research study.   Originating Site (Patient Location): Patient's other car  Distant Site (Provider Location): Provider Remote Setting- Home Office  Consent:  The patient has verbally consented to: the potential risks and benefits of telemedicine versus in person care with special emphasis on confidentiality given family members in the home.  Attestation: As the provider I attest to compliance with applicable laws and regulations related to telemedicine.         Identifying Data:  Tracy Jeffrey is a 28 year old female who prefers the name of \"Tracy\" & pronouns she, her.     Encounter Diagnoses   Name Primary?    Moderate episode of recurrent major depressive disorder (H) Yes    NIKOLAY (generalized anxiety disorder)        Session Summary and Treatment Strategies  1. Reviewed weekly monitoring since previous session (Form 1)    Patient successfully completed monitoring on 12/14 previous days.  2. Reviewed completion of activity assigned during previous session (Form 1)    Patient completed 2/2 scheduled activities the previous week.  3. Reviewed how client can get " social support for activity completion (Form 4)  Completed Form 4: identified social supports for cleaning, exercise.   4. Collaboratively agreed on planned activities until next session (Form 1)    -Wash face before bed   -Read Tarot Cards   -Clean dining room table    Session Assignments:   - Complete Weekly monitoring with activity planning until next session (Form 1)               See #4 above     Patient's reaction to treatment strategies: cooperative, positive.  Patient's progress toward treatment goals: on track, progressing steadily towards treatment goals     Treatment Plan:   Complete between session activation assignments (above):  Continue with behavioral activation therapy with writer until completion of 8 sessions per study requirement.  Continue with other healthcare providers    MENTAL STATUS EXAM                                                                                       Alertness: alert  and oriented  Appearance: well groomed  Behavior/Demeanor: cooperative, pleasant, and calm, with good  eye contact   Speech: normal and regular rate and rhythm Intact. Normal volume, nasima. No prosody.  Language: good. Preferred language identified as English.  Psychomotor: normal or unremarkable  Mood: anxious  Affect: full range; was congruent to mood; was congruent to content  Thought Process/Associations: unremarkable  Thought Content:  Reports none;  Denies suicidal ideation and violent ideation  Perception:  Reports none;  Denies none; intact   Insight: excellent  Judgment: excellent  Cognition: (6) does  appear grossly intact; formal cognitive testing was not done  Gait and Station: unremarkable     Miguel Cuevas I did not directly participate in this patient visit. I discussed this patient with the above trainee in individual supervision and agree with the note and plan as documented.    Supervisor: Ani Lora, PhD, LP

## 2024-12-05 ENCOUNTER — VIRTUAL VISIT (OUTPATIENT)
Dept: BEHAVIORAL HEALTH | Facility: CLINIC | Age: 29
End: 2024-12-05
Payer: COMMERCIAL

## 2024-12-05 DIAGNOSIS — F33.1 MODERATE EPISODE OF RECURRENT MAJOR DEPRESSIVE DISORDER (H): Primary | ICD-10-CM

## 2024-12-05 DIAGNOSIS — F41.1 GAD (GENERALIZED ANXIETY DISORDER): ICD-10-CM

## 2024-12-09 NOTE — PROGRESS NOTES
"[facility]  Department of Psychiatry & Behavioral Sciences  [facility]        Date of Service: Dec 5, 2024  Time of interview with patient: Start Time: 4:00PM Stop Time: 4:45PM  Provider: Miguel Cuevas M.A.  Referred by GERMAIN STUDY: Tracy Jeffrey was randomized to the behavioral activation therapy arm in the context of clinical research  People present:   Provider: Miguel Cuevas M.A.  Patient: Tracy Jeffrey  Others: NA     Telehealth Video Visit AddOn:  Type of service: Telemedicine Office Visit for depression .  The patient's condition can be safely assessed and treated via synchronous audio and visual telemedicine encounter.    Mode of Communication:  Video Conference via [employer]  Reason for Telemedicine Visit: This patient visit was a Telehealth video visit as part of a telehealth research study.   Originating Site (Patient Location): Patient's other car  Distant Site (Provider Location): Provider Remote Setting- Home Office  Consent:  The patient has verbally consented to: the potential risks and benefits of telemedicine versus in person care with special emphasis on confidentiality given family members in the home.  Attestation: As the provider I attest to compliance with applicable laws and regulations related to telemedicine.         Identifying Data:  Tracy Jeffrey is a 28 year old female who prefers the name of \"Tracy\" & pronouns she, her.    Encounter Diagnoses   Name Primary?    Moderate episode of recurrent major depressive disorder (H) Yes    NIKOLAY (generalized anxiety disorder)        Session Summary and Treatment Strategies  1. Reviewed weekly monitoring since previous session (Form 1)    Patient completed monitoring on ~10/14 previous days. Attributed reduced monitoring this week to being busy with wedding planning.  2. Reviewed completion of activity assigned during previous session (Form 1)    Patient successfully completed 3/3 " scheduled activities the previous week.  3. Reviewed BA Concept (e.g., self-monitoring, relation activity-mood; exploring values, connecting activities to values, scheduling activities, integrating monitoring with activity scheduling, exploring/addressing barriers to completing activities, evaluating outcomes of activities) (10 mins)    Reviewed relationship between values and activities, updated activities list.  4. Collaboratively agreed on planned activities until next session (Form 1)    -Get a massage   -Organize play activities for students at work.    Session Assignments:   - Complete Weekly monitoring with activity planning until next session (Form 1)    See #4 above    Patient's reaction to treatment strategies: cooperative, positive.  Patient's progress toward treatment goals: on track, progressing steadily towards treatment goals     Treatment Plan:   Complete between session activation assignments (above):  Continue with behavioral activation therapy with writer until completion of 8 sessions per study requirement.  Continue with other healthcare providers    MENTAL STATUS EXAM                                                                                       Alertness: alert  and oriented  Appearance: well groomed  Behavior/Demeanor: cooperative, pleasant, and calm, with good  eye contact   Speech: normal and regular rate and rhythm Intact. Normal volume, nasima. No prosody.  Language: good. Preferred language identified as English.  Psychomotor: normal or unremarkable  Mood: anxious  Affect: full range; was congruent to mood; was congruent to content  Thought Process/Associations: unremarkable  Thought Content:  Reports none;  Denies suicidal ideation and violent ideation  Perception:  Reports none;  Denies none; intact   Insight: excellent  Judgment: excellent  Cognition: (6) does  appear grossly intact; formal cognitive testing was not done  Gait and Station: unremarkable    I did not directly  participate in this patient visit. I discussed this patient with the above trainee in individual supervision and agree with the note and plan as documented.    Supervisor: Ani Lora, PhD, RADHA Cuevas

## 2024-12-12 ENCOUNTER — VIRTUAL VISIT (OUTPATIENT)
Dept: BEHAVIORAL HEALTH | Facility: CLINIC | Age: 29
End: 2024-12-12
Payer: COMMERCIAL

## 2024-12-12 DIAGNOSIS — F41.1 GAD (GENERALIZED ANXIETY DISORDER): ICD-10-CM

## 2024-12-12 DIAGNOSIS — F33.1 MODERATE EPISODE OF RECURRENT MAJOR DEPRESSIVE DISORDER (H): Primary | ICD-10-CM

## 2024-12-16 NOTE — PROGRESS NOTES
"[facility]  Department of Psychiatry & Behavioral Sciences  [facility]        Date of Service: Dec 12, 2024  Time of interview with patient: Start Time: 4:00PM Stop Time: 4:45PM  Provider: Miguel Cuevas M.A.  Referred by GERMAIN STUDY: Tracy Jeffrey was randomized to the behavioral activation therapy arm in the context of clinical research  People present:   Provider: Miguel Cuevas M.A.  Patient: Tracy Jeffrey  Others: NA     Telehealth Video Visit AddOn:  Type of service: Telemedicine Office Visit for depression .  The patient's condition can be safely assessed and treated via synchronous audio and visual telemedicine encounter.    Mode of Communication:  Video Conference via [employer]  Reason for Telemedicine Visit: This patient visit was a Telehealth video visit as part of a telehealth research study.   Originating Site (Patient Location): Patient's other car  Distant Site (Provider Location): Provider Remote Setting- Home Office  Consent:  The patient has verbally consented to: the potential risks and benefits of telemedicine versus in person care with special emphasis on confidentiality given family members in the home.  Attestation: As the provider I attest to compliance with applicable laws and regulations related to telemedicine.         Identifying Data:  Tracy Jeffrey is a 28 year old female who prefers the name of \"Tracy\" & pronouns she, her.    Encounter Diagnoses   Name Primary?    Moderate episode of recurrent major depressive disorder (H) Yes    NIKOLAY (generalized anxiety disorder)        Session Summary and Treatment Strategies  1. Reviewed weekly monitoring since previous session (Form 1)    Patient completed monitoring on ~5/7 previous days. Attributed reduced monitoring this week to being busy with wedding events.   2. Reviewed completion of activity assigned during previous session (Form 1)    Patient successfully completed 2/2 " assigned activities the previous week.  3. Reviewed BA Concept (e.g., self-monitoring, relation activity-mood; exploring values, connecting activities to values, scheduling activities, integrating monitoring with activity scheduling, exploring/addressing barriers to completing activities, evaluating outcomes of activities) (10 mins)    Reviewed BA rationale including downward spiral model of depression, mood benefits of staying active, reason for activity scheduling and monitoring.  4. Collaboratively agreed on planned activities until next session (Form 1)    -Get massage   -Go on date with    -Wash face before bed  Session Assignments:   - Complete Weekly monitoring with activity planning until next session (Form 1)               See #4 above     Patient's reaction to treatment strategies: cooperative, positive.  Patient's progress toward treatment goals: on track, progressing steadily towards treatment goals     Treatment Plan:   Complete between session activation assignments (above):  Continue with behavioral activation therapy with writer until completion of 8 sessions per study requirement.  Continue with other healthcare providers    MENTAL STATUS EXAM                                                                                       Alertness: alert  and oriented  Appearance: well groomed  Behavior/Demeanor: cooperative, pleasant, and calm, with good  eye contact   Speech: normal and regular rate and rhythm Intact. Normal volume, nasima. No prosody.  Language: good. Preferred language identified as English.  Psychomotor: normal or unremarkable  Mood: anxious  Affect: full range; was congruent to mood; was congruent to content  Thought Process/Associations: unremarkable  Thought Content:  Reports none;  Denies suicidal ideation and violent ideation  Perception:  Reports none;  Denies none; intact   Insight: excellent  Judgment: excellent  Cognition: (6) does  appear grossly intact; formal  cognitive testing was not done  Gait and Station: enoch Cuevas I did not directly participate in this patient visit. I discussed this patient with the above trainee in individual supervision and agree with the note and plan as documented.    Supervisor: Ani Lora, PhD, LP

## 2024-12-19 ENCOUNTER — VIRTUAL VISIT (OUTPATIENT)
Dept: BEHAVIORAL HEALTH | Facility: CLINIC | Age: 29
End: 2024-12-19
Payer: COMMERCIAL

## 2024-12-19 DIAGNOSIS — F41.1 GAD (GENERALIZED ANXIETY DISORDER): ICD-10-CM

## 2024-12-19 DIAGNOSIS — F33.1 MODERATE EPISODE OF RECURRENT MAJOR DEPRESSIVE DISORDER (H): Primary | ICD-10-CM

## 2025-03-03 NOTE — PROGRESS NOTES
"[facility]  Department of Psychiatry & Behavioral Sciences  [facility] COMPARE BA Session 7        Date of Service: Dec 19, 2024  Time of interview with patient: Start Time: 4:00PM Stop Time: 4:45PM  Provider: Miguel Cuevas M.A.  Referred by GERMAIN STUDY: Tracy Jeffrey was randomized to the behavioral activation therapy arm in the context of clinical research  People present:   Provider: Miguel Cuevas M.A.  Patient: Tracy Jeffrey  Others: NA     Telehealth Video Visit AddOn:  Type of service: Telemedicine Office Visit for depression .  The patient's condition can be safely assessed and treated via synchronous audio and visual telemedicine encounter.    Mode of Communication:  Video Conference via [employer]  Reason for Telemedicine Visit: This patient visit was a Telehealth video visit as part of a telehealth research study.   Originating Site (Patient Location): Patient's other car  Distant Site (Provider Location): Provider Remote Setting- Home Office  Consent:  The patient has verbally consented to: the potential risks and benefits of telemedicine versus in person care with special emphasis on confidentiality given family members in the home.  Attestation: As the provider I attest to compliance with applicable laws and regulations related to telemedicine.         Identifying Data:  Tracy Jeffrey is a 28 year old female who prefers the name of \"Tracy\" & pronouns she, her.    Encounter Diagnoses   Name Primary?    Moderate episode of recurrent major depressive disorder (H) Yes    NIKOLAY (generalized anxiety disorder)        Session Summary and Treatment Strategies  1. Reviewed weekly monitoring since previous session (Form 1)   2. Reviewed completion of activity assigned during previous session (Form 1)    Patient successfully completed 3/3 assigned activities the previous week.  - spent quality time w/  playing video games  - scheduled massage  - completed " evening face wash routine  3. Reviewed BA Concept (10 mins)   4. Collaboratively agreed on and scheduled 2 activities to be completed prior to next session (Form 1)   -play board or card game w/ family  -do craft activity    Session Assignments:   - Complete Weekly monitoring with activity planning until next session (Form 1)               See #4 above     Patient's reaction to treatment strategies: cooperative, positive.  Patient's progress toward treatment goals: on track, progressing steadily towards treatment goals     Treatment Plan:   Complete between session activation assignments (above):  Continue with behavioral activation therapy with writer until completion of 8 sessions per study requirement.  Continue with other healthcare providers    MENTAL STATUS EXAM                                                                                       Alertness: alert  and oriented  Appearance: well groomed  Behavior/Demeanor: cooperative, pleasant, and calm, with good  eye contact   Speech: normal and regular rate and rhythm Intact. Normal volume, nasima. No prosody.  Language: good. Preferred language identified as English.  Psychomotor: normal or unremarkable  Mood: anxious  Affect: full range; was congruent to mood; was congruent to content  Thought Process/Associations: unremarkable  Thought Content:  Reports none;  Denies suicidal ideation and violent ideation  Perception:  Reports none;  Denies none; intact   Insight: excellent  Judgment: excellent  Cognition: (6) does  appear grossly intact; formal cognitive testing was not done  Gait and Station: unremarkable    Miguel Cuevas      I did not directly participate in this patient visit. I discussed this patient with the above trainee in individual supervision and agree with the note and plan as documented.    Supervisor: Ani Lora, PhD, LP

## 2025-07-12 ENCOUNTER — HEALTH MAINTENANCE LETTER (OUTPATIENT)
Age: 30
End: 2025-07-12